# Patient Record
Sex: MALE | Race: WHITE | HISPANIC OR LATINO | Employment: UNEMPLOYED | URBAN - METROPOLITAN AREA
[De-identification: names, ages, dates, MRNs, and addresses within clinical notes are randomized per-mention and may not be internally consistent; named-entity substitution may affect disease eponyms.]

---

## 2017-12-14 ENCOUNTER — GENERIC CONVERSION - ENCOUNTER (OUTPATIENT)
Dept: FAMILY MEDICINE CLINIC | Facility: CLINIC | Age: 57
End: 2017-12-14

## 2017-12-18 ENCOUNTER — APPOINTMENT (OUTPATIENT)
Dept: LAB | Facility: CLINIC | Age: 57
End: 2017-12-18
Payer: COMMERCIAL

## 2017-12-18 ENCOUNTER — ALLSCRIPTS OFFICE VISIT (OUTPATIENT)
Dept: OTHER | Facility: OTHER | Age: 57
End: 2017-12-18

## 2017-12-18 DIAGNOSIS — E11.9 TYPE 2 DIABETES MELLITUS WITHOUT COMPLICATIONS (HCC): ICD-10-CM

## 2017-12-18 DIAGNOSIS — N17.9 ACUTE KIDNEY FAILURE (HCC): ICD-10-CM

## 2017-12-18 DIAGNOSIS — E87.0 HYPEROSMOLALITY AND HYPERNATREMIA: ICD-10-CM

## 2017-12-18 DIAGNOSIS — E87.6 HYPOKALEMIA: ICD-10-CM

## 2017-12-18 DIAGNOSIS — Z09 ENCOUNTER FOR FOLLOW-UP EXAMINATION AFTER COMPLETED TREATMENT FOR CONDITIONS OTHER THAN MALIGNANT NEOPLASM: ICD-10-CM

## 2017-12-18 LAB
ALBUMIN SERPL BCP-MCNC: 3.7 G/DL (ref 3.5–5)
ALP SERPL-CCNC: 92 U/L (ref 46–116)
ALT SERPL W P-5'-P-CCNC: 99 U/L (ref 12–78)
ANION GAP SERPL CALCULATED.3IONS-SCNC: 6 MMOL/L (ref 4–13)
AST SERPL W P-5'-P-CCNC: 68 U/L (ref 5–45)
BILIRUB SERPL-MCNC: 0.85 MG/DL (ref 0.2–1)
BUN SERPL-MCNC: 13 MG/DL (ref 5–25)
CALCIUM SERPL-MCNC: 9.7 MG/DL (ref 8.3–10.1)
CHLORIDE SERPL-SCNC: 100 MMOL/L (ref 100–108)
CO2 SERPL-SCNC: 28 MMOL/L (ref 21–32)
CREAT SERPL-MCNC: 1.11 MG/DL (ref 0.6–1.3)
GFR SERPL CREATININE-BSD FRML MDRD: 73 ML/MIN/1.73SQ M
GLUCOSE P FAST SERPL-MCNC: 268 MG/DL (ref 65–99)
POTASSIUM SERPL-SCNC: 4.3 MMOL/L (ref 3.5–5.3)
PROT SERPL-MCNC: 7.7 G/DL (ref 6.4–8.2)
SODIUM SERPL-SCNC: 134 MMOL/L (ref 136–145)

## 2017-12-18 PROCEDURE — 36415 COLL VENOUS BLD VENIPUNCTURE: CPT

## 2017-12-18 PROCEDURE — 80053 COMPREHEN METABOLIC PANEL: CPT

## 2018-01-09 ENCOUNTER — LAB CONVERSION - ENCOUNTER (OUTPATIENT)
Dept: OTHER | Facility: OTHER | Age: 58
End: 2018-01-09

## 2018-01-09 ENCOUNTER — GENERIC CONVERSION - ENCOUNTER (OUTPATIENT)
Dept: OTHER | Facility: OTHER | Age: 58
End: 2018-01-09

## 2018-01-09 LAB
BUN SERPL-MCNC: 16 MG/DL (ref 7–25)
BUN/CREA RATIO (HISTORICAL): NORMAL (CALC) (ref 6–22)
CALCIUM SERPL-MCNC: 10.1 MG/DL (ref 8.6–10.3)
CHLORIDE SERPL-SCNC: 103 MMOL/L (ref 98–110)
CHOLEST SERPL-MCNC: 195 MG/DL
CHOLEST/HDLC SERPL: 4.8 (CALC)
CO2 SERPL-SCNC: 31 MMOL/L (ref 20–31)
CREAT SERPL-MCNC: 1.05 MG/DL (ref 0.7–1.33)
EGFR AFRICAN AMERICAN (HISTORICAL): 91 ML/MIN/1.73M2
EGFR-AMERICAN CALC (HISTORICAL): 78 ML/MIN/1.73M2
GLUCOSE (HISTORICAL): 89 MG/DL (ref 65–99)
HBA1C MFR BLD HPLC: 10.9 % OF TOTAL HGB
HDLC SERPL-MCNC: 41 MG/DL
LDL CHOLESTEROL (HISTORICAL): 127 MG/DL (CALC)
NON-HDL-CHOL (CHOL-HDL) (HISTORICAL): 154 MG/DL (CALC)
POTASSIUM SERPL-SCNC: 4.5 MMOL/L (ref 3.5–5.3)
SODIUM SERPL-SCNC: 141 MMOL/L (ref 135–146)
TRIGL SERPL-MCNC: 154 MG/DL

## 2018-01-18 DIAGNOSIS — E87.0 HYPEROSMOLALITY AND HYPERNATREMIA: ICD-10-CM

## 2018-01-18 DIAGNOSIS — Z09 ENCOUNTER FOR FOLLOW-UP EXAMINATION AFTER COMPLETED TREATMENT FOR CONDITIONS OTHER THAN MALIGNANT NEOPLASM: ICD-10-CM

## 2018-01-18 DIAGNOSIS — E87.6 HYPOKALEMIA: ICD-10-CM

## 2018-01-18 DIAGNOSIS — N17.9 ACUTE KIDNEY FAILURE (HCC): ICD-10-CM

## 2018-01-18 DIAGNOSIS — E11.9 TYPE 2 DIABETES MELLITUS WITHOUT COMPLICATIONS (HCC): ICD-10-CM

## 2018-01-23 NOTE — MISCELLANEOUS
Assessment    1  Acute kidney injury (584 9) (N17 9)   2  Diabetes mellitus, new onset (250 00) (E11 9)   3  Hospital discharge follow-up (V67 59) (Z09)   4  Hypernatremia (276 0) (E87 0)   5  Hypokalemia (276 8) (E87 6)   6  Epigastric abdominal pain (789 06) (R10 13)   7  Blurred vision (368 8) (H53 8)    Plan  Acute kidney injury, Diabetes mellitus, new onset, Hospital discharge follow-up,  Hypernatremia, Hypokalemia    · (1) COMPREHENSIVE METABOLIC PANEL; Status:Complete;   Done: 98EEA5836   Perform:[facility]; TYK:[id number];RLJXBGM; For:Acute kidney injury, Diabetes mellitus, new onset, Hospital discharge follow-up, Hypernatremia, Hypokalemia; Ordered By:Jo Nava;   · (1) COMPREHENSIVE METABOLIC PANEL; Status:Resulted - Requires Verification;    Done: 47CTB1355 10:33AM   Due:25Ykw8895; Ordered; For:Acute kidney injury, Diabetes mellitus, new onset, Hospital discharge follow-up, Hypernatremia, Hypokalemia; Ordered By:Jo Nava;   · (1) HEMOGLOBIN A1C; Status:Active; Requested ASK:[id number]; Perform:[facility]; OAF:[id number];QWRLJKI; For:Acute kidney injury, Diabetes mellitus, new onset, Hospital discharge follow-up, Hypernatremia, Hypokalemia; Ordered By:Jo Nava;   · (1) LIPID PANEL, FASTING; Status:Active; Requested TXN:[id number]; Perform:[facility]; BGE:[id number];EIUFGMA; For:Acute kidney injury, Diabetes mellitus, new onset, Hospital discharge follow-up, Hypernatremia, Hypokalemia; Ordered By:Jo Nava;  Diabetes mellitus, new onset    · GlyBURIDE 5 MG Oral Tablet; TAKE 1 TABLET TWICE DAILY,  WITH meals   Rx By: Darlene Adame; Dispense: 30 Days ; #:60 Tablet; Refill: 0; For: Diabetes mellitus, new onset; RICHIE = N; Verified Transmission to [place] [id number]; Last Updated By: System, [employer]; 12/18/2017 9:33:28 AM   · (1) BASIC METABOLIC PROFILE; Status:Active; Requested PTL:[id number];     Perform:[facility]; TCV:[id number];[facility]; For:Diabetes mellitus, new onset; Ordered By:Truong Nava;  Encounter for screening colonoscopy    · COLONOSCOPY; Status:Active; Requested for:21Sjd2270;    Perform:[facility]; Due:17Tys9115; Last Updated By:Omar Das; 12/18/2017 9:14:30 AM;Ordered;  For:Encounter for screening colonoscopy; Ordered By:Truong Nava;   · *1 -  [facility]-Management  *  Status:  Active  Requested for: 97YOD2372   Ordered; For: Encounter for screening colonoscopy; Ordered By: Elysia Jimenez Performed:  Due: 24CRQ8996; Last Updated By: Zenia John; 12/18/2017 9:14:31 AM  Care Summary provided  : Yes  Epigastric abdominal pain    · RaNITidine HCl - 150 MG Oral Tablet (Zantac); one tab before bed and as needed  during the day for stomach pain   Rx By: Elysia Jimenez; Dispense: 0 Days ; #:60 Tablet; Refill: 1; For: Epigastric abdominal pain; RICHIE = N; Verified Transmission to [facility]; Last Updated By: System, [employer]; 12/18/2017 9:33:28 AM    Discussion/Summary  Discussion Summary:   ROSANNE - new DM at [facility] glyburide to twice a day  keep levemir to 15 units  May be able to start metformin at next visit consider weaning levemir off  Need update labs this week - see Dr Asad Cole in 2 weeks  Needs eye exam - had blurry vision in hosp     + epigastric pain - exam benign - consistent w/ GERD - take zantac before bed and as needed during the day  Counseling Documentation With Imm: The patient was counseled regarding diagnostic results, instructions for management, risk factor reductions, prognosis, patient and family education, impressions, risks and benefits of treatment options, importance of compliance with treatment  Medication SE Review and Pt Understands Tx: Possible side effects of new medications were reviewed with the patient/guardian today  The treatment plan was reviewed with the patient/guardian   The patient/guardian understands and agrees with the treatment plan   Self Referrals:   Self Referrals: No      Chief Complaint  Chief Complaint Free Text Note Form: pt here for a ROSANNE visit  pt has never been on any medications  pt states that the insulin prescribed is costly and wants to change to something less expensive  complaining of some stomach discomfort  pt needs note for work       History of Present Illness  TCM Communication [place]: The patient is being contacted for Contact made with Maci Washington from [address] [employer] [employer] [facility] prior to patient being discharged on 12/14/2017  He was hospitalized at [facility]#[phone]  The date of admission: 12/13/2017, date of discharge: 12/14/2017  Diagnosis: fatigue, frequent urination, sore throat  He was discharged to home  Medications were not reviewed today  He scheduled a follow up appointment  Follow-up appointments with other specialists: pcp follow up 12/18/2017  Communication performed and completed by Jenniffer German   HPI: 40-year-old  male male here to establish care from a hospitalization and [facility] - patient without any past medical history except for what he presented to the hospital    pt was transferred to [place] from his work in [address] [facility]  S W - bc of blurry visin/ an polydipsia and polyuria   > Patient diagnosed with acute onset of diabetes mellitus with a blood sugar initially above a 1000, hyponatremia, acute kidney injury and electrolyte imbalance  Patient was fluid resuscitated and started on IV insulin and DKA resolved  Patient was transferred to the Levemir insulin 15 units nightly plus glyburide p o  daily  The patient was given a glucometer to use at home  Patient was also started on lisinopril 5  Reports blood sugar this morning was 257  Most recent labs from the hospital were reviewed CBC was normal   Potassium 3 2  BUN 11 %period% creatinine 0 88 with a GFR greater than 60  Albumin 4 6     LDL 89 and HDL 34   12 point ROS neg except stomach pain - mild - no b/v/d - no chg w/ food/ no radiation      Review of Systems  Complete-Male:   Constitutional: No fever or chills, feels well, no tiredness, no recent weight gain or weight loss  Eyes: No complaints of eye pain, no red eyes, no discharge from eyes, no itchy eyes  ENT: no complaints of earache, no hearing loss, no nosebleeds, no nasal discharge, no sore throat, no hoarseness  Cardiovascular: No complaints of slow heart rate, no fast heart rate, no chest pain, no palpitations, no leg claudication, no lower extremity  Respiratory: No complaints of shortness of breath, no wheezing, no cough, no SOB on exertion, no orthopnea or PND  Gastrointestinal: abdominal pain, but no nausea, no vomiting, no constipation, no diarrhea and no blood in stools  Genitourinary: No complaints of dysuria, no incontinence, no hesitancy, no nocturia, no genital lesion, no testicular pain  Musculoskeletal: No complaints of arthralgia, no myalgias, no joint swelling or stiffness, no limb pain or swelling  Integumentary: No complaints of skin rash or skin lesions, no itching, no skin wound, no dry skin  Neurological: No compliants of headache, no confusion, no convulsions, no numbness or tingling, no dizziness or fainting, no limb weakness, no difficulty walking  Psychiatric: Is not suicidal, no sleep disturbances, no anxiety or depression, no change in personality, no emotional problems  Endocrine: No complaints of proptosis, no hot flashes, no muscle weakness, no erectile dysfunction, no deepening of the voice, no feelings of weakness and as noted in HPI  Hematologic/Lymphatic: No complaints of swollen glands, no swollen glands in the neck, does not bleed easily, no easy bruising  Surgical History    1  History of Prior Surgical Procedure Not Done  Surgical History Reviewed: The surgical history was reviewed and updated today  Family History  Father    1   Family history of malignant neoplasm (V16 9) (Z80 9)    Social History    · Caffeine use (V49 89) (F15 90)   · Does not exercise (V69 0) (Z72 3)   · Full-time employment   · Never a smoker   · No illicit drug use   · Non-smoker (V49 89) (Z78 9)   · Nonuser of seat belts   · [employer]   · Single   · Social alcohol use (Z78 9)  Social History Reviewed: The social history was reviewed and updated today  Current Meds   1  GlyBURIDE 5 MG Oral Tablet; TAKE 1 TABLET DAILY; Therapy: (Recorded:76Hkt6587) to Recorded   2  Levemir 100 UNIT/ML Subcutaneous Solution; INJECT 15 UNIT Bedtime; Therapy: (Recorded:28Cfa8188) to Recorded   3  Lisinopril 5 MG Oral Tablet; TAKE 1 TABLET DAILY; Therapy: (Recorded:64Foj6920) to Recorded    Allergies    1  No Known Drug Allergies    Vitals  Signs   Recorded: 22VOM5029 08:52AM   Temperature: 99 2 F  Heart Rate: 71  Respiration: 16  Systolic: 867  Diastolic: 96  Height: 5 ft 6 in  Weight: 166 lb   BMI Calculated: 26 79  BSA Calculated: 1 85  O2 Saturation: 95  Pain Scale: 0    Physical Exam    Constitutional   General appearance: No acute distress, well appearing and well nourished  Eyes   Conjunctiva and lids: No swelling, erythema, or discharge  Pupils and irises: Equal, round and reactive to light  Ears, Nose, Mouth, and Throat   External inspection of ears and nose: Normal     Otoscopic examination: Tympanic membrance translucent with normal light reflex  Canals patent without erythema  Nasal mucosa, septum, and turbinates: Normal without edema or erythema  Oropharynx: Normal with no erythema, edema, exudate or lesions  Pulmonary   Respiratory effort: No increased work of breathing or signs of respiratory distress  Auscultation of lungs: Clear to auscultation, equal breath sounds bilaterally, no wheezes, no rales, no rhonci  Cardiovascular   Palpation of heart: Normal PMI, no thrills  Auscultation of heart: Normal rate and rhythm, normal S1 and S2, without murmurs      Examination of extremities for edema and/or varicosities: Normal     Carotid pulses: Normal     Abdomen   Abdomen: Non-tender, no masses  Bowel sounds were normal  The abdomen was soft and nontender  no masses palpated  Liver and spleen: No hepatomegaly or splenomegaly  Lymphatic   Palpation of lymph nodes in neck: No lymphadenopathy  Musculoskeletal   Gait and station: Normal     Skin   Skin and subcutaneous tissue: Normal without rashes or lesions  Neurologic   Cranial nerves: Cranial nerves 2-12 intact  Psychiatric   Orientation to person, place and time: Normal     Mood and affect: Normal          Results/Data  (1) COMPREHENSIVE METABOLIC PANEL [id number] 04:07OY Vito Lozoya    Order Number: [id number]     Test Name Result Flag Reference   SODIUM 134 mmol/L L 136-145   POTASSIUM 4 3 mmol/L  3 5-5 3   CHLORIDE 100 mmol/L  100-108   CARBON DIOXIDE 28 mmol/L  21-32   ANION GAP (CALC) 6 mmol/L  4-13   BLOOD UREA NITROGEN 13 mg/dL  5-25   CREATININE 1 11 mg/dL  0 60-1 30   Standardized to IDMS reference method   CALCIUM 9 7 mg/dL  8 3-10 1   BILI, TOTAL 0 85 mg/dL  0 20-1 00   ALK PHOSPHATAS 92 U/L     ALT (SGPT) 99 U/L H 12-78   Specimen collection should occur prior to Sulfasalazine and/or Sulfapyridine administration due to the potential for falsely depressed results  AST(SGOT) 68 U/L H 5-45   Specimen collection should occur prior to Sulfasalazine administration due to the potential for falsely depressed results  ALBUMIN 3 7 g/dL  3 5-5 0   TOTAL PROTEIN 7 7 g/dL  6 4-8 2   eGFR 73 ml/min/1 73sq m     National Kidney Disease Education Program recommendations are as follows:  GFR calculation is accurate only with a steady state creatinine  Chronic Kidney disease less than 60 ml/min/1 73 sq  meters  Kidney failure less than 15 ml/min/1 73 sq  meters     GLUCOSE FASTING 268 mg/dL H 65-99   Specimen collection should occur prior to Sulfasalazine administration due to the potential for falsely depressed results  Specimen collection should occur prior to Sulfapyridine administration due to the potential for falsely elevated results  Attending Note  Collaborating Physician Note: Collaborating Physician: I agree with the Advanced Practitioner note  Future Appointments    Date/Time Provider Specialty Site   01/26/2018 09:30 AM YULISSA Yaevgerry Torres 476   01/31/2018 10:40 AM Brett Victoria MD Gastroenterology Adult [facility] [address] [place] [address]   Electronically signed by : Munira Villalba; Dec 18 2017  9:50AM EST                       (Author)    Electronically signed by : YULISSA Ellington ; Dec 18 2017  5:11PM EST                       (Author)

## 2018-01-23 NOTE — RESULT NOTES
Verified Results  (1) LIPID PANEL, FASTING [id number] 08:07AM Hedda Garden     Test Name Result Flag Reference   CHOLESTEROL, TOTAL 195 mg/dL  <200   HDL CHOLESTEROL 41 mg/dL  >13   TRIGLICERIDES 518 mg/dL H <150   LDL-CHOLESTEROL 127 mg/dL (calc) H    Reference range: <100     Desirable range <100 mg/dL for patients with CHD or  diabetes and <70 mg/dL for diabetic patients with  known heart disease  LDL-C is now calculated using the Elías-Ken   calculation, which is a validated novel method providing   better accuracy than the Friedewald equation in the   estimation of LDL-C  Jaqueline Tompkins  [place] [id number];279(89): [phone]   (http://[employer]/faq/ZGR473)   CHOL/HDLC RATIO 4 8 (calc)  <5 0   NON HDL CHOLESTEROL 154 mg/dL (calc) H <130   For patients with diabetes plus 1 major ASCVD risk   factor, treating to a non-HDL-C goal of <100 mg/dL   (LDL-C of <70 mg/dL) is considered a therapeutic   option  (1) BASIC METABOLIC PROFILE [id number] 08:07AM Hedda Garden     Test Name Result Flag Reference   GLUCOSE 89 mg/dL  65-99   Fasting reference interval   UREA NITROGEN (BUN) 16 mg/dL  7-25   CREATININE 1 05 mg/dL  0 70-1 33   For patients >52years of age, the reference limit  for Creatinine is approximately 13% higher for people  identified as -American  eGFR NON-AFR   AMERICAN 78 mL/min/1 73m2  > OR = 60   eGFR AFRICAN AMERICAN 91 mL/min/1 73m2  > OR = 60   BUN/CREATININE RATIO   5-44   NOT APPLICABLE (calc)   SODIUM 141 mmol/L  135-146   POTASSIUM 4 5 mmol/L  3 5-5 3   CHLORIDE 103 mmol/L     CARBON DIOXIDE 31 mmol/L  20-31   CALCIUM 10 1 mg/dL  8 6-10 3     (Q) HEMOGLOBIN A1c [id number] 08:07AM Jo Nava   REPORT COMMENT:  FASTING:YES     Test Name Result Flag Reference   HEMOGLOBIN A1c 10 9 % of total Hgb H <5 7   For someone without known diabetes, a hemoglobin A1c  value of 6 5% or greater indicates that they may have   diabetes and this should be confirmed with a follow-up   test      For someone with known diabetes, a value <7% indicates   that their diabetes is well controlled and a value   greater than or equal to 7% indicates suboptimal   control  A1c targets should be individualized based on   duration of diabetes, age, comorbid conditions, and   other considerations  Currently, no consensus exists regarding use of  hemoglobin A1c for diagnosis of diabetes for children

## 2018-01-23 NOTE — MISCELLANEOUS
Message  Return to work or school:   Pooja Moraes is under my professional care   He was seen in my office on 12/18/2017   He is able to return to work on  12/25/2017            Signatures   Electronically signed by : Katya Carrillo, ; Dec 18 2017  9:56AM EST                       (/Recorder)

## 2018-01-24 VITALS
DIASTOLIC BLOOD PRESSURE: 96 MMHG | RESPIRATION RATE: 16 BRPM | SYSTOLIC BLOOD PRESSURE: 142 MMHG | OXYGEN SATURATION: 95 % | HEIGHT: 66 IN | WEIGHT: 166 LBS | HEART RATE: 71 BPM | BODY MASS INDEX: 26.68 KG/M2 | TEMPERATURE: 99.2 F

## 2018-01-26 ENCOUNTER — OFFICE VISIT (OUTPATIENT)
Dept: FAMILY MEDICINE CLINIC | Facility: CLINIC | Age: 58
End: 2018-01-26
Payer: COMMERCIAL

## 2018-01-26 VITALS
RESPIRATION RATE: 16 BRPM | HEART RATE: 61 BPM | BODY MASS INDEX: 27.8 KG/M2 | WEIGHT: 173 LBS | HEIGHT: 66 IN | OXYGEN SATURATION: 98 % | DIASTOLIC BLOOD PRESSURE: 78 MMHG | SYSTOLIC BLOOD PRESSURE: 130 MMHG | TEMPERATURE: 98.6 F

## 2018-01-26 DIAGNOSIS — E11.9 DIABETES MELLITUS, NEW ONSET (HCC): Primary | ICD-10-CM

## 2018-01-26 DIAGNOSIS — R10.13 EPIGASTRIC ABDOMINAL PAIN: ICD-10-CM

## 2018-01-26 PROBLEM — N17.9 ACUTE KIDNEY INJURY (HCC): Status: ACTIVE | Noted: 2017-12-18

## 2018-01-26 PROBLEM — E87.6 HYPOKALEMIA: Status: ACTIVE | Noted: 2017-12-18

## 2018-01-26 PROBLEM — E87.0 HYPERNATREMIA: Status: ACTIVE | Noted: 2017-12-18

## 2018-01-26 PROBLEM — H53.8 BLURRED VISION: Status: ACTIVE | Noted: 2017-12-18

## 2018-01-26 PROCEDURE — 99214 OFFICE O/P EST MOD 30 MIN: CPT | Performed by: FAMILY MEDICINE

## 2018-01-26 RX ORDER — GLYBURIDE 5 MG/1
TABLET ORAL
COMMUNITY
End: 2018-02-05 | Stop reason: SDUPTHER

## 2018-01-26 RX ORDER — LISINOPRIL 5 MG/1
1 TABLET ORAL DAILY
COMMUNITY
End: 2018-04-19 | Stop reason: SDUPTHER

## 2018-01-26 RX ORDER — RANITIDINE 150 MG/1
TABLET ORAL
COMMUNITY
Start: 2017-12-18 | End: 2018-04-19 | Stop reason: SDUPTHER

## 2018-01-26 NOTE — PROGRESS NOTES
Assessment/Plan:    No problem-specific Assessment & Plan notes found for this encounter  Diagnoses and all orders for this visit:    Diabetes mellitus, new onset ([address] )    Epigastric abdominal pain    Other orders  -     glyBURIDE (DIABETA) 5 mg tablet; Take by mouth  -     insulin detemir (LEVEMIR) 100 units/mL subcutaneous injection; Inject 15 Units under the skin  -     lisinopril (ZESTRIL) 5 mg tablet; Take 1 tablet by mouth daily  -     ranitidine (ZANTAC) 150 mg tablet; Take by mouth    Fu in 3 month + labs        Subjective:   Pt here for follow up visit  Pt feels good today  Pt has no issues with new medications given  Pt states that his glucose reading have been low 77-80 fasting  Pt wants to know how many times he should check glucose  Pt needs eye exam, will print out order again     Patient ID: Idania Oreilly is a 62 y o  male  HPI   He states doing better, his fasting sugar are under control at 77-80  He has tried modified DM diet  Taking meds and tolerating  The following portions of the patient's history were reviewed and updated as appropriate: allergies, current medications, past family history, past medical history, past social history, past surgical history and problem list     Review of Systems   Constitutional: Negative  HENT: Negative  Respiratory: Negative  Cardiovascular: Negative  Genitourinary: Negative  Neurological: Negative  Psychiatric/Behavioral: Negative  Objective:  /78   Pulse 61   Temp 98 6 °F (37 °C)   Resp 16   Ht 5' 6" (1 676 m)   Wt 78 5 kg (173 lb)   SpO2 98%   BMI 27 92 kg/m²      Physical Exam   Constitutional: He is oriented to person, place, and time  He appears well-developed and well-nourished  HENT:   Head: Normocephalic and atraumatic  Eyes: Conjunctivae are normal    Neck: Neck supple  Cardiovascular: Normal rate, regular rhythm and normal heart sounds      Pulmonary/Chest: Effort normal and breath sounds normal    Musculoskeletal: Normal range of motion  Neurological: He is alert and oriented to person, place, and time  Psychiatric: He has a normal mood and affect   His behavior is normal

## 2018-01-29 DIAGNOSIS — Z12.11 ENCOUNTER FOR SCREENING COLONOSCOPY: Primary | ICD-10-CM

## 2018-01-31 ENCOUNTER — OFFICE VISIT (OUTPATIENT)
Dept: GASTROENTEROLOGY | Facility: MEDICAL CENTER | Age: 58
End: 2018-01-31
Payer: COMMERCIAL

## 2018-01-31 VITALS
TEMPERATURE: 96.5 F | HEART RATE: 60 BPM | BODY MASS INDEX: 28.28 KG/M2 | HEIGHT: 66 IN | SYSTOLIC BLOOD PRESSURE: 120 MMHG | DIASTOLIC BLOOD PRESSURE: 80 MMHG | WEIGHT: 176 LBS

## 2018-01-31 DIAGNOSIS — R10.13 EPIGASTRIC ABDOMINAL PAIN: Primary | ICD-10-CM

## 2018-01-31 DIAGNOSIS — Z12.11 ENCOUNTER FOR SCREENING COLONOSCOPY: ICD-10-CM

## 2018-01-31 PROCEDURE — 99244 OFF/OP CNSLTJ NEW/EST MOD 40: CPT | Performed by: INTERNAL MEDICINE

## 2018-01-31 RX ORDER — PEG-3350, SODIUM SULFATE, SODIUM CHLORIDE, POTASSIUM CHLORIDE, SODIUM ASCORBATE AND ASCORBIC ACID 7.5-2.691G
2000 KIT ORAL ONCE
Qty: 2000 ML | Refills: 0 | Status: SHIPPED | OUTPATIENT
Start: 2018-01-31 | End: 2018-11-05

## 2018-01-31 NOTE — LETTER
January 31, 2018     Anisa Samuel MD  [facility]  [address]  [address] [place] [address]    Patient: Enrique Whitt   YOB: 1960   Date of Visit: 1/31/2018       Dear Dr Carreon Blunt: Thank you for referring Enrique Whitt to me for evaluation  Below are my notes for this consultation  If you have questions, please do not hesitate to call me  I look forward to following your patient along with you  Sincerely,        Kayla Blakely MD        CC: No Recipients  Kayla Blakely MD  1/31/2018 11:16 AM  Sign at close encounter  [facility] - Outpatient Consultation  Enrique Whitt 62 y o  male MRN: [id number]  Encounter: [id number]          ASSESSMENT AND PLAN:      1  Epigastric abdominal pain  His epigastric pain was likely due to peptic ulcer disease, erosive gastritis, or Helicobacter pylori infection  It is unclear what medication he has been taking but it may be omeprazole  I have asked him to continue the medication since it is helping and bring the name with him to his procedure  I will schedule for an upper endoscopy to evaluate the etiology of his pain and take biopsies for Helicobacter pylori infection     - Case request operating room: EGD    2  Encounter for screening colonoscopy  He is due for screening colonoscopy given his age over 48  I spoke about the benefits and risks of the procedure as well as instructions for the bowel preparation and answered all of his questions  Per his preference, I will schedule this procedure for him at the time of his upper endoscopy  - Case request operating room: COLONOSCOPY  - PEG 3350-KCl-NaCl-NaSulf-Na Asc-C (MOVIPREP) 100 g; Take 2,000 mL by mouth once for 1 dose  Dispense: 2000 mL; Refill: 0    ______________________________________________________________________    HPI:  He presents for evaluation of epigastric pain that began about six weeks ago    Around that time he was diagnosed with diabetes mellitus and started on treatment for this  He was also started on another medication for his epigastric pain but does not remember what it was  He is still taking that medication and feels his pain has resolved  He has not had any nausea, vomiting, reflux, dysphagia, change in bowel habits, bleeding, or weight loss  He denies any prior history of an upper endoscopy or colonoscopy  REVIEW OF SYSTEMS:    CONSTITUTIONAL: Denies any fever, chills, rigors, and weight loss  HEENT: No earache or tinnitus  Denies hearing loss or visual disturbances  CARDIOVASCULAR: No chest pain or palpitations  RESPIRATORY: Denies any cough, hemoptysis, shortness of breath or dyspnea on exertion  GASTROINTESTINAL: As noted in the History of Present Illness  GENITOURINARY: No problems with urination  Denies any hematuria or dysuria  NEUROLOGIC: No dizziness or vertigo, denies headaches  MUSCULOSKELETAL: Denies any muscle or joint pain  SKIN: Denies skin rashes or itching  ENDOCRINE: Denies excessive thirst  Denies intolerance to heat or cold  PSYCHOSOCIAL: Denies depression or anxiety  Denies any recent memory loss  Historical Information   History reviewed  No pertinent past medical history  History reviewed  No pertinent surgical history    Social History   History   Alcohol Use    Yes     Comment: social alcohol use     History   Drug Use No     History   Smoking Status    Never Smoker   Smokeless Tobacco    Never Used     Family History   Problem Relation Age of Onset    Cancer Father        Meds/Allergies       Current Outpatient Prescriptions:     glyBURIDE (DIABETA) 5 mg tablet    insulin detemir (LEVEMIR) 100 units/mL subcutaneous injection    lisinopril (ZESTRIL) 5 mg tablet    ranitidine (ZANTAC) 150 mg tablet    PEG 3350-KCl-NaCl-NaSulf-Na Asc-C (MOVIPREP) 100 g    No Known Allergies        Objective     Blood pressure 120/80, pulse 60, temperature (!) 96 5 °F (35 8 °C), temperature source Tympanic, height 5' 6" (1 676 m), weight 79 8 kg (176 lb)  PHYSICAL EXAM:      General Appearance:   Alert, cooperative, no distress   HEENT:   Normocephalic, atraumatic, anicteric      Neck:  Supple, symmetrical, trachea midline   Lungs:   Clear to auscultation bilaterally; no rales, rhonchi or wheezing; respirations unlabored    Heart[de-identified]   Regular rate and rhythm; no murmur, rub, or gallop  Abdomen:   Soft, non-tender, non-distended; normal bowel sounds; no masses, no organomegaly    Genitalia:   Deferred    Rectal:   Deferred    Extremities:  No cyanosis, clubbing or edema    Pulses:  2+ and symmetric all extremities    Skin:  No jaundice, rashes, or lesions    Lymph nodes:  No palpable cervical lymphadenopathy        Lab Results:   No visits with results within 1 Day(s) from this visit     Latest known visit with results is:   Lab Conversion - Encounter on 01/09/2018   Component Date Value    Hemoglobin A1C 01/08/2018 10 9*

## 2018-01-31 NOTE — PATIENT INSTRUCTIONS
Colon/EGD sched on 3/19/18 with Dr Moisés Mcmullen at [facility]  Moviprep/ instructions giving to pt   Pt is diabetic

## 2018-01-31 NOTE — PROGRESS NOTES
[facility] - Outpatient Consultation  Kaleb MadridLadora 62 y o  male MRN: [id number]  Encounter: [id number]          ASSESSMENT AND PLAN:      1  Epigastric abdominal pain  His epigastric pain was likely due to peptic ulcer disease, erosive gastritis, or Helicobacter pylori infection  It is unclear what medication he has been taking but it may be omeprazole  I have asked him to continue the medication since it is helping and bring the name with him to his procedure  I will schedule for an upper endoscopy to evaluate the etiology of his pain and take biopsies for Helicobacter pylori infection     - Case request operating room: EGD    2  Encounter for screening colonoscopy  He is due for screening colonoscopy given his age over 48  I spoke about the benefits and risks of the procedure as well as instructions for the bowel preparation and answered all of his questions  Per his preference, I will schedule this procedure for him at the time of his upper endoscopy  - Case request operating room: COLONOSCOPY  - PEG 3350-KCl-NaCl-NaSulf-Na Asc-C (MOVIPREP) 100 g; Take 2,000 mL by mouth once for 1 dose  Dispense: 2000 mL; Refill: 0    ______________________________________________________________________    HPI:  He presents for evaluation of epigastric pain that began about six weeks ago  Around that time he was diagnosed with diabetes mellitus and started on treatment for this  He was also started on another medication for his epigastric pain but does not remember what it was  He is still taking that medication and feels his pain has resolved  He has not had any nausea, vomiting, reflux, dysphagia, change in bowel habits, bleeding, or weight loss  He denies any prior history of an upper endoscopy or colonoscopy  REVIEW OF SYSTEMS:    CONSTITUTIONAL: Denies any fever, chills, rigors, and weight loss  HEENT: No earache or tinnitus   Denies hearing loss or visual disturbances  CARDIOVASCULAR: No chest pain or palpitations  RESPIRATORY: Denies any cough, hemoptysis, shortness of breath or dyspnea on exertion  GASTROINTESTINAL: As noted in the History of Present Illness  GENITOURINARY: No problems with urination  Denies any hematuria or dysuria  NEUROLOGIC: No dizziness or vertigo, denies headaches  MUSCULOSKELETAL: Denies any muscle or joint pain  SKIN: Denies skin rashes or itching  ENDOCRINE: Denies excessive thirst  Denies intolerance to heat or cold  PSYCHOSOCIAL: Denies depression or anxiety  Denies any recent memory loss  Historical Information   History reviewed  No pertinent past medical history  History reviewed  No pertinent surgical history  Social History   History   Alcohol Use    Yes     Comment: social alcohol use     History   Drug Use No     History   Smoking Status    Never Smoker   Smokeless Tobacco    Never Used     Family History   Problem Relation Age of Onset    Cancer Father        Meds/Allergies       Current Outpatient Prescriptions:     glyBURIDE (DIABETA) 5 mg tablet    insulin detemir (LEVEMIR) 100 units/mL subcutaneous injection    lisinopril (ZESTRIL) 5 mg tablet    ranitidine (ZANTAC) 150 mg tablet    PEG 3350-KCl-NaCl-NaSulf-Na Asc-C (MOVIPREP) 100 g    No Known Allergies        Objective     Blood pressure 120/80, pulse 60, temperature (!) 96 5 °F (35 8 °C), temperature source Tympanic, height 5' 6" (1 676 m), weight 79 8 kg (176 lb)  PHYSICAL EXAM:      General Appearance:   Alert, cooperative, no distress   HEENT:   Normocephalic, atraumatic, anicteric      Neck:  Supple, symmetrical, trachea midline   Lungs:   Clear to auscultation bilaterally; no rales, rhonchi or wheezing; respirations unlabored    Heart[de-identified]   Regular rate and rhythm; no murmur, rub, or gallop     Abdomen:   Soft, non-tender, non-distended; normal bowel sounds; no masses, no organomegaly    Genitalia:   Deferred    Rectal:   Deferred  Extremities:  No cyanosis, clubbing or edema    Pulses:  2+ and symmetric all extremities    Skin:  No jaundice, rashes, or lesions    Lymph nodes:  No palpable cervical lymphadenopathy        Lab Results:   No visits with results within 1 Day(s) from this visit     Latest known visit with results is:   Lab Conversion - Encounter on 01/09/2018   Component Date Value    Hemoglobin A1C 01/08/2018 10 9*

## 2018-02-05 DIAGNOSIS — E13.8 DIABETES MELLITUS OF OTHER TYPE WITH COMPLICATION, UNSPECIFIED LONG TERM INSULIN USE STATUS: Primary | ICD-10-CM

## 2018-02-05 RX ORDER — GLYBURIDE 5 MG/1
TABLET ORAL
Qty: 60 TABLET | Refills: 5 | Status: SHIPPED | OUTPATIENT
Start: 2018-02-05 | End: 2018-04-19 | Stop reason: SDUPTHER

## 2018-03-18 ENCOUNTER — ANESTHESIA EVENT (OUTPATIENT)
Dept: GASTROENTEROLOGY | Facility: MEDICAL CENTER | Age: 58
End: 2018-03-18
Payer: COMMERCIAL

## 2018-03-19 ENCOUNTER — ANESTHESIA (OUTPATIENT)
Dept: GASTROENTEROLOGY | Facility: MEDICAL CENTER | Age: 58
End: 2018-03-19
Payer: COMMERCIAL

## 2018-03-19 ENCOUNTER — HOSPITAL ENCOUNTER (OUTPATIENT)
Facility: MEDICAL CENTER | Age: 58
Setting detail: OUTPATIENT SURGERY
Discharge: HOME | End: 2018-03-19
Attending: INTERNAL MEDICINE | Admitting: INTERNAL MEDICINE
Payer: COMMERCIAL

## 2018-03-19 VITALS
HEART RATE: 77 BPM | TEMPERATURE: 98.4 F | RESPIRATION RATE: 13 BRPM | BODY MASS INDEX: 28.28 KG/M2 | WEIGHT: 176 LBS | DIASTOLIC BLOOD PRESSURE: 82 MMHG | OXYGEN SATURATION: 98 % | SYSTOLIC BLOOD PRESSURE: 129 MMHG | HEIGHT: 66 IN

## 2018-03-19 DIAGNOSIS — R10.13 EPIGASTRIC ABDOMINAL PAIN: ICD-10-CM

## 2018-03-19 DIAGNOSIS — Z12.11 ENCOUNTER FOR SCREENING COLONOSCOPY: ICD-10-CM

## 2018-03-19 LAB — GLUCOSE SERPL-MCNC: 87 MG/DL (ref 65–140)

## 2018-03-19 PROCEDURE — 88305 TISSUE EXAM BY PATHOLOGIST: CPT | Performed by: PATHOLOGY

## 2018-03-19 PROCEDURE — 43239 EGD BIOPSY SINGLE/MULTIPLE: CPT | Performed by: INTERNAL MEDICINE

## 2018-03-19 PROCEDURE — 88342 IMHCHEM/IMCYTCHM 1ST ANTB: CPT | Performed by: PATHOLOGY

## 2018-03-19 PROCEDURE — 45380 COLONOSCOPY AND BIOPSY: CPT | Performed by: INTERNAL MEDICINE

## 2018-03-19 PROCEDURE — 82948 REAGENT STRIP/BLOOD GLUCOSE: CPT

## 2018-03-19 RX ORDER — PROPOFOL 10 MG/ML
INJECTION, EMULSION INTRAVENOUS AS NEEDED
Status: DISCONTINUED | OUTPATIENT
Start: 2018-03-19 | End: 2018-03-19 | Stop reason: SURG

## 2018-03-19 RX ORDER — SODIUM CHLORIDE 9 MG/ML
125 INJECTION, SOLUTION INTRAVENOUS CONTINUOUS
Status: DISCONTINUED | OUTPATIENT
Start: 2018-03-19 | End: 2018-03-19 | Stop reason: HOSPADM

## 2018-03-19 RX ADMIN — PROPOFOL 50 MG: 10 INJECTION, EMULSION INTRAVENOUS at 09:48

## 2018-03-19 RX ADMIN — SODIUM CHLORIDE: 0.9 INJECTION, SOLUTION INTRAVENOUS at 09:20

## 2018-03-19 RX ADMIN — PROPOFOL 50 MG: 10 INJECTION, EMULSION INTRAVENOUS at 09:42

## 2018-03-19 RX ADMIN — SODIUM CHLORIDE 125 ML/HR: 0.9 INJECTION, SOLUTION INTRAVENOUS at 08:47

## 2018-03-19 RX ADMIN — PROPOFOL 50 MG: 10 INJECTION, EMULSION INTRAVENOUS at 09:55

## 2018-03-19 RX ADMIN — PROPOFOL 150 MG: 10 INJECTION, EMULSION INTRAVENOUS at 09:27

## 2018-03-19 RX ADMIN — LIDOCAINE HYDROCHLORIDE 50 MG: 20 INJECTION, SOLUTION INTRAVENOUS at 09:27

## 2018-03-19 RX ADMIN — PROPOFOL 50 MG: 10 INJECTION, EMULSION INTRAVENOUS at 09:36

## 2018-03-19 NOTE — OP NOTE
OPERATIVE REPORT  PATIENT NAME: Titus Soulier    :  1960  MRN: [id number]  Pt Location: [facility] GI ROOM 01    SURGERY DATE: 3/19/2018    Surgeon(s) and Role:     * Antonino Allen MD - Primary    Colonoscopy Procedure Note    Procedure: Colonoscopy    Sedation: Monitored anesthesia care, check anesthesia records      ASA Class: 2    INDICATIONS: Screening for Colon Cancer    POST-OP DIAGNOSIS: See the impression below    Procedure Details     Prior colonoscopy: No prior colonoscopy  Informed consent was obtained for the procedure, including sedation  Risks of perforation, hemorrhage, adverse drug reaction and aspiration were discussed  The patient was placed in the left lateral decubitus position  Based on the pre-procedure assessment, including review of the patient's medical history, medications, allergies, and review of systems, he had been deemed to be an appropriate candidate for conscious sedation; he was therefore sedated with the medications listed below  The patient was monitored continuously with telemetry, pulse oximetry, blood pressure monitoring, and direct observations  A rectal examination was performed  The colonoscope was inserted into the rectum and advanced under direct vision to the cecum, which was identified by the ileocecal valve and appendiceal orifice  The quality of the colonic preparation was good  A careful inspection was made as the colonoscope was withdrawn, including a retroflexed view of the rectum; findings and interventions are described below  Findings: There was a 3 mm sessile polyp in the sigmoid colon that was removed with excisional biopsy  Small hemorrhoids were seen on the retroflexed view of the exam was otherwise unremarkable  Complications:  None; patient tolerated the procedure well  Impression:    Small sigmoid polyp removed    Recommendations:  Repeat colonoscopy in five years if the polyp is an adenoma      SIGNATURE: Bhavin Gonzalez Munir Murillo MD  DATE: March 19, 2018  TIME: 10:00 AM

## 2018-03-19 NOTE — ANESTHESIA PREPROCEDURE EVALUATION
Review of Systems/Medical History  Patient summary reviewed  Chart reviewed  No history of anesthetic complications     Cardiovascular  Negative cardio ROS Hypertension controlled,    Pulmonary  Negative pulmonary ROS        GI/Hepatic    GERD well controlled, Bowel prep            Endo/Other  Diabetes well controlled type 2 Insulin,      GYN       Hematology  Negative hematology ROS      Musculoskeletal  Negative musculoskeletal ROS        Neurology  Negative neurology ROS      Psychology   Negative psychology ROS              Physical Exam    Airway    Mallampati score: II  TM Distance: >3 FB  Neck ROM: full     Dental       Cardiovascular  Comment: Negative ROS, Rhythm: regular, Rate: normal, Cardiovascular exam normal    Pulmonary  Pulmonary exam normal Breath sounds clear to auscultation,     Other Findings        Anesthesia Plan  ASA Score- 2     Anesthesia Type- IV sedation with anesthesia with ASA Monitors  Additional Monitors:   Airway Plan:         Plan Factors-Patient not instructed to abstain from smoking on day of procedure  Patient did not smoke on day of surgery  Induction-     Postoperative Plan-     Informed Consent- Anesthetic plan and risks discussed with patient

## 2018-03-19 NOTE — OP NOTE
OPERATIVE REPORT  PATIENT NAME: Enrique Whitt    :  1960  MRN: [id number]  Pt Location: [facility] GI ROOM 01    SURGERY DATE: 3/19/2018    Surgeon(s) and Role:     * Kayla Blakely MD - Primary    ESOPHAGOGASTRODUODENOSCOPY    PROCEDURE: EGD    SEDATION: Monitored anesthesia care, check anesthesia records    ASA Class: 2    INDICATIONS:  Epigastric pain  CONSENT:  Informed consent was obtained for the procedure, including sedation after explaining the risks and benefits of the procedure  Risks including but not limited to bleeding, perforation, infection, and missed lesion  PREPARATION:   Telemetry, pulse oximetry, blood pressure were monitored throughout the procedure  Patient was identified by myself both verbally and by visual inspection of ID band  DESCRIPTION:   Patient was placed in the left lateral decubitus position and was sedated with the above medication  The gastroscope was introduced in to the oropharynx and the esophagus was intubated under direct visualization  Scope was passed down the esophagus up to 2nd part of the duodenum  A careful inspection was made as the gastroscope was withdrawn, including a retroflexed view of the stomach; findings and interventions are described below  FINDINGS:    #1  Esophagus- there is a tiny polyp in the distal esophagus that was removed with excisional biopsy  #2  Stomach- mild gastritis was noted  Random biopsies were taken from the antrum and body to rule out Helicobacter pylori infection  Retroflexed view of the stomach was unremarkable  #3  Duodenum- mild duodenitis was noted  IMPRESSIONS:      Tiny esophageal polyp  Mild gastritis    RECOMMENDATIONS:     Await pathology results  Continue current medications  Colonoscopy to follow    COMPLICATIONS:  None; patient tolerated the procedure well            DISPOSITION: PACU           CONDITION: Stable      SIGNATURE: Kayla Blakely MD  DATE: 2018  TIME: 9:41 AM

## 2018-03-19 NOTE — H&P
History and Physical -  [facility]  Caridad Sorensen 62 y o  male MRN: [id number]                  HPI: Caridad Sorensen is a 62y o  year old male who presents for epigastric pain and colon cancer screening  REVIEW OF SYSTEMS: Per the HPI, and otherwise unremarkable  Historical Information   Past Medical History:   Diagnosis Date    Diabetes mellitus ([address] )     Epigastric abdominal pain     Epigastric pain     GERD (gastroesophageal reflux disease)     Hypertension      Past Surgical History:   Procedure Laterality Date    NO PAST SURGERIES       Social History   History   Alcohol Use No     Comment: social alcohol use     History   Drug Use No     History   Smoking Status    Never Smoker   Smokeless Tobacco    Never Used     Family History   Problem Relation Age of Onset    Cancer Father        Meds/Allergies     Prescriptions Prior to Admission   Medication    glyBURIDE (DIABETA) 5 mg tablet    insulin detemir (LEVEMIR) 100 units/mL subcutaneous injection    lisinopril (ZESTRIL) 5 mg tablet    ranitidine (ZANTAC) 150 mg tablet    PEG 3350-KCl-NaCl-NaSulf-Na Asc-C (MOVIPREP) 100 g       No Known Allergies    Objective     Blood pressure 146/78, pulse 84, temperature 98 4 °F (36 9 °C), temperature source Temporal, resp  rate 16, height 5' 6" (1 676 m), weight 79 8 kg (176 lb), SpO2 99 %  PHYSICAL EXAM    Gen: NAD  CV: RRR  CHEST: Clear  ABD: soft, NT/ND  EXT: no edema      ASSESSMENT/PLAN:  This is a 62y o  year old male here for epigastric pain and colon cancer screening  PLAN:   Procedure:  Upper endoscopy and colonoscopy

## 2018-03-19 NOTE — DISCHARGE INSTRUCTIONS
Endoscopia superior   LO QUE NECESITA SABER:   Rashid endoscopia superior también se conoce angela endoscopia gastrointestinal (GI) superior o esofagogastroduodenoscopia (EGD)  Usted podría sentirse inflamado, con gases o sentir molestia abdominal después de bryant procedimiento  Bryant garganta podría estar adolorida por 24 a 36 horas  Usted podría eructar o expulsar gas debido al aire que todavía está en bryant cuerpo  INSTRUCCIONES SOBRE [facility]:   Llame al 911 si presenta:   · Tiene dolor en el pecho o dificultad para respirar de forma repentina  Busque atención médica de inmediato si:   · Está mareado o siente que se va a desmayar  · Usted tiene dificultad para tragar  · Usted tiene dolor de garganta intenso  · Radha evacuaciones intestinales son Selinda Dueñas o negras  · Bryant abdomen está [place] y firme y usted siente dolor intenso  · Usted vomita feng  Pregúntele a bryant [place] vitaminas y minerales son adecuados para usted  · Usted se siente lleno o hinchado y no puede eructar o expulsar gas  · Usted no ha tenido rashid evacuación intestinal después de 3 días de bryant procedimiento  · Usted tiene dolor de javi  · Usted tiene fiebre o escalofríos  · Usted tiene náuseas o está vomitando  · Usted tiene salpullido o urticaria  · Usted tiene preguntas o inquietudes acerca de bryant endoscopia  Alivie el dolor de garganta:  Chupe pastillas para la garganta o hielo triturado  Valente gárgaras con rashid pequeña cantidad de agua tibia con sal  Mezcle 1 cucharadita de sal y 1 taza de agua tibia para hacer agua salada  Alivie el gas y la molestia de la inflamación:  Acuéstese sobre bryant costado derecho con rashid almohada térmica sobre bryant abdomen  Camine un poco para ayudar a expulsar el gas  Coma comidas pequeñas hasta que se alivie de la inflamación  Descanse después de bryant procedimiento:  No maneje o tome decisiones importantes hasta el día siguiente de bryant procedimiento   Regrese a radha actividades normales según le indiquen  Usted generalmente puede regresar al [place] al día siguiente de berry procedimiento  Acuda a radha consultas de control con berry médico según le indicaron  Anote radha preguntas para que se acuerde de hacerlas celia radha visitas  © 2017 [address] Information is for End User's use only and may not be sold, redistributed or otherwise used for commercial purposes  All illustrations and images included in CareNotes® are the copyrighted property of A D A M , Inc  or Joseph Schofield  Esta información es sólo para uso en educación  Berry intención no es darle un consejo médico sobre enfermedades o tratamientos  Colsulte con berry Ozie Sharp farmacéutico antes de seguir cualquier régimen médico para saber si es seguro y efectivo para usted  Colonoscopia   LO QUE NECESITA SABER:   Rashid colonoscopia es un procedimiento para examinar con un endoscopio el interior de berry colon (intestino)  Celia rashid colonoscopia, es posible que le retiren pólipos o crecimientos de tejidos  Es normal que se sienta inflamado o que tenga molestia abdominal  Usted debería estar expulsando los gases  Si tiene hemorroides o si le removieron pólipos, usted podría presentar rashid pequeña cantidad de sangrado  INSTRUCCIONES SOBRE EL [facility]:   Busque atención médica de inmediato si:   · Usted presenta rashid cantidad jeronimo de feng carmine brillante en radha evacuaciones intestinales  · Berry abdomen está [place] y firme y usted siente dolor intenso  · Usted tiene dificultad repentina para respirar  Pregúntele a berry [place] vitaminas y minerales son adecuados para usted  · Usted presenta sarpullido o urticaria  · Usted tiene fiebre dentro de las 24 horas después de berry procedimiento       · Usted no ha tenido rashid evacuación intestinal después de 3 días de berry procedimiento  · Usted tiene preguntas o inquietudes acerca de berry condición o cuidado    Actividad:   · No levante nada, no se esfuerce o corra  por 3 días después de berry procedimiento  · Descanse después de berry procedimiento  A usted le alvarenga administrado medicamento para relajarse  No  maneje o tome decisiones importantes hasta el día siguiente de berry procedimiento  Regrese a radha actividades normales según le indiquen  · Alivie los gases y la incomodidad de la inflamación  acostándose en berry costado derecho con rashid almohada térmica sobre berry abdomen  Es posible que necesite caminar un poco para ayudar a eliminar los gases  Coma comidas pequeñas hasta que se alivie de la inflamación  Si a usted le removieron pólipos:  Por 7 días después de berry procedimiento:  · No  tome aspirina  · No  realice paseos largos en [place]  Acuda a radha consultas de control con berry médico según le indicaron  Anote radha preguntas para que se acuerde de hacerlas celia radha visitas  © 2017 geri Gauthiernijoan  Information is for End User's use only and may not be sold, redistributed or otherwise used for commercial purposes  All illustrations and images included in CareNotes® are the copyrighted property of A D A M , Inc  or Joseph Schofield  Esta información es sólo para uso en educación  Berry intención no es darle un consejo médico sobre enfermedades o tratamientos  Colsulte con berry Laruth Jag farmacéutico antes de seguir cualquier régimen médico para saber si es seguro y efectivo para usted  Pólipos colorrectales   LO QUE NECESITA SABER:   ¿Qué son los pólipos colorrectales? Los pólipos colorrectales son pequeñas protuberancias de tejido que se encuentran en el forro del colon y recto  Aubree Starcher de los pólipos son hiperplásticos y típicamente son benignos (no cancerosos)  Ciertos tipos de pólipos llamados adenomatosos, podrían convertirse en cáncer  ¿Qué aumenta mi riesgo de pólipos colorrectales? La causa exacta de los pólipos colorrectales no se conoce   [address] [place] [address] berry riesgo:  · Edad avanzada    · Rashid dieta de alimentos altos en grasa y bajos en fibra     · Antecedente familiar de pólipos    · Enfermedades intestinales angela enfermedad de Chron o colitis ulcerativa    · Un estilo de susanne no saludable angela falta de [place], fumar o beber alcohol    · Obesidad  ¿Cuáles son los signos y síntomas de los pólipos colorrectales? · Feng en radha evacuaciones intestinales o sangrado proveniente de bryant recto    · Cambios en los hábitos de defecación, angela diarrea y estreñimiento    · Dolor abdominal  ¿Cómo se diagnostican los pólipos colorrectales? Debe hacerse rashid prueba de feng fecal para la detección de enfermedades colorrectales si usted tiene más de 48 años de [place]  Se recomienda rashid evaluación antes si usted sufre de rashid enfermedad intestinal o si tiene antecedentes familiares de cáncer colorrectal o de pólipos  Eduardo esta evaluación, revisan [place] Spell de bryant materia fecal para feng, lo cual puede ser indicativo de rashid señal temprana de cáncer o pólipos  Es posible que también deba hacerse alguno de los siguientes exámenes:  · Examen digital del recto:  Bryant médico le examinará el ano y utilizará un dedo para revisar bryant recto en búsqueda de pólipos  · Enema del bario: El enema opaco es rashid radiografía del colon  Se coloca un tubo en bryant ano y se pone un líquido llamado bario por el tubo  El bario se [place] para que los médicos puedan jennifer el colon mejor en la radiografía  · Colonoscopia virtual:  Esta es rashid tomografía computarizada que rene imágenes del interior de bryant colon y recto  Se inserta un tubo pequeño y flexible en bryant recto e introducen dióxido de carbono (gas) para expandir bryant colon  [place] permite que los médicos vean claramente bryant colon y cualquier pólipo en un monitor  · Colonoscopia o sigmoidoscopia:  Estos procedimientos ayudan a que el médico alondra interior de bryant colon, mediante el uso de un tubo flexible con Tamera Dooley Jason en bryant extremidad  Celia rashid sigmoidoscopia, el médico le revisará el recto y la parte inferior de bryant colon  Celia rashid colonoscopía, los médicos revisan el colon en bryant totalidad  Es probable que los médicos extraigan rashid pequeña cantidad de tejido del colon para realizar rashid biopsia  ¿Cómo se tratan los pólipos colorrectales? · Remoción de pólipos: Se podrían remover pólipos celia bryant colonoscopía o sigmoidoscopía  · Polipectomía:  Esta es rashid cirugía para remover radha pólipos  Usted podría necesitar cirugía abierta o laparoscópica, dependiendo del tipo, tamaño y número de pólipos que tenga  La laparoscopía se realiza al insertar un endoscopio pequeño y flexible en las incisiones que le hicieron en el abdomen  Celia la [place], se hace rashid incisión más jeronimo en bryant abdomen  ¿Cuáles son los riesgos de los pólipos colorrectales? Es probable que [employer] procedimiento de la colonoscopía  Bryant intestino podría perforarse (desgarrarse) al remover los pólipos  [place] se podría llevar a rashid cirugía abierta abdominal  Celia la cirugía, usted podría sangrar demasiado o contraer rashid infección  Los pólipos adenomatosos que no se remueven podrían convertirse en cáncer y ser [employer] difíciles de tratar  ¿Dónde puedo obtener [de-identified] y [employer] información? · Germán Jones (NDDI)  [address], [place] [address]  Phone: [phone]  Web Address: [facility] gov  ¿Cuándo melissa comunicarme con mi médico?   · Usted tiene fiebre  · Usted tiene escalofríos, tos o se siente débil y adolorido  · Usted tiene dolor abdominal que no desaparece o aumenta después de fer bryant medicamento  · Bryant abdomen se encuentra inflamado  · Usted pierde peso sin proponérselo  · Usted tiene preguntas o inquietudes acerca de bryant condición o cuidado  ¿Cuándo melissa buscar atención inmediata o llamar al 911? · Usted tiene falta de aire repentina       · Tiene el ritmo cardíaco acelerado, la respiración acelerada o está demasiado mareado o débil para pararse  · Usted tiene dolor abdominal intenso  · Usted ve feng en radha deposiciones  ACUERDOS SOBRE SALAZAR CUIDADO:   Usted tiene el derecho de ayudar a planear salazar cuidado  Aprenda todo lo que pueda sobre salazar condición y angela darle tratamiento  Discuta radha opciones de tratamiento con radha médicos para decidir el cuidado que usted desea recibir  Usted siempre tiene el derecho de rechazar el tratamiento  Esta información es sólo para uso en educación  Salazar intención no es darle un consejo médico sobre enfermedades o tratamientos  Colsulte con salazar Freeda Callahan farmacéutico antes de seguir cualquier régimen médico para saber si es seguro y efectivo para usted  © 2017 [address] [facility] Information is for End User's use only and may not be sold, redistributed or otherwise used for commercial purposes  All illustrations and images included in CareNotes® are the copyrighted property of A JUAQUIN A YULISSA , Inc  or Joseph Schofield

## 2018-03-19 NOTE — ANESTHESIA POSTPROCEDURE EVALUATION
Post-Op Assessment Note      CV Status:  Stable    Mental Status:  Alert and awake    Hydration Status:  Euvolemic    PONV Controlled:  Controlled    Airway Patency:  Patent    Post Op Vitals Reviewed: Yes          Staff: Anesthesiologist           /59 (03/19/18 1004)    Temp      Pulse 98 (03/19/18 1004)   Resp 15 (03/19/18 1004)    SpO2 96 % (03/19/18 1004)

## 2018-03-25 NOTE — PROGRESS NOTES
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

## 2018-04-19 DIAGNOSIS — E11.8 CONTROLLED DIABETES MELLITUS TYPE 2 WITH COMPLICATIONS, UNSPECIFIED WHETHER LONG TERM INSULIN USE (HCC): Primary | ICD-10-CM

## 2018-04-19 DIAGNOSIS — R10.13 EPIGASTRIC ABDOMINAL PAIN: ICD-10-CM

## 2018-04-19 RX ORDER — LISINOPRIL 5 MG/1
5 TABLET ORAL DAILY
Qty: 30 TABLET | Refills: 5 | Status: SHIPPED | OUTPATIENT
Start: 2018-04-19 | End: 2018-10-08 | Stop reason: SDUPTHER

## 2018-04-19 RX ORDER — GLYBURIDE 5 MG/1
TABLET ORAL
Qty: 60 TABLET | Refills: 3 | Status: SHIPPED | OUTPATIENT
Start: 2018-04-19 | End: 2018-08-24 | Stop reason: SDUPTHER

## 2018-04-19 RX ORDER — RANITIDINE 150 MG/1
TABLET ORAL
Qty: 60 TABLET | Refills: 1 | Status: SHIPPED | OUTPATIENT
Start: 2018-04-19 | End: 2018-11-05

## 2018-04-24 DIAGNOSIS — E11.8 CONTROLLED DIABETES MELLITUS TYPE 2 WITH COMPLICATIONS, UNSPECIFIED WHETHER LONG TERM INSULIN USE (HCC): Primary | ICD-10-CM

## 2018-04-24 RX ORDER — BLOOD SUGAR DIAGNOSTIC
1 STRIP MISCELLANEOUS
Qty: 100 EACH | Refills: 5 | Status: SHIPPED | OUTPATIENT
Start: 2018-04-24 | End: 2019-05-06

## 2018-04-27 LAB
ALBUMIN SERPL-MCNC: 4.5 G/DL (ref 3.6–5.1)
ALBUMIN/CREAT UR: 33 MCG/MG CREAT
ALBUMIN/GLOB SERPL: 1.8 (CALC) (ref 1–2.5)
ALP SERPL-CCNC: 60 U/L (ref 40–115)
ALT SERPL-CCNC: 22 U/L (ref 9–46)
AST SERPL-CCNC: 19 U/L (ref 10–35)
BILIRUB SERPL-MCNC: 0.4 MG/DL (ref 0.2–1.2)
BUN SERPL-MCNC: 27 MG/DL (ref 7–25)
BUN/CREAT SERPL: 24 (CALC) (ref 6–22)
CALCIUM SERPL-MCNC: 10.3 MG/DL (ref 8.6–10.3)
CHLORIDE SERPL-SCNC: 104 MMOL/L (ref 98–110)
CHOLEST SERPL-MCNC: 205 MG/DL
CHOLEST/HDLC SERPL: 5.1 (CALC)
CO2 SERPL-SCNC: 26 MMOL/L (ref 20–31)
CREAT SERPL-MCNC: 1.14 MG/DL (ref 0.7–1.33)
CREAT UR-MCNC: 72 MG/DL (ref 20–370)
GLOBULIN SER CALC-MCNC: 2.5 G/DL (CALC) (ref 1.9–3.7)
GLUCOSE SERPL-MCNC: 82 MG/DL (ref 65–99)
HBA1C MFR BLD: 5.6 % OF TOTAL HGB
HDLC SERPL-MCNC: 40 MG/DL
LDLC SERPL CALC-MCNC: 128 MG/DL (CALC)
MICROALBUMIN UR-MCNC: 2.4 MG/DL
NONHDLC SERPL-MCNC: 165 MG/DL (CALC)
POTASSIUM SERPL-SCNC: 4 MMOL/L (ref 3.5–5.3)
PROT SERPL-MCNC: 7 G/DL (ref 6.1–8.1)
SL AMB EGFR AFRICAN AMERICAN: 82 ML/MIN/1.73M2
SL AMB EGFR NON AFRICAN AMERICAN: 70 ML/MIN/1.73M2
SODIUM SERPL-SCNC: 140 MMOL/L (ref 135–146)
TRIGL SERPL-MCNC: 220 MG/DL

## 2018-05-30 NOTE — PROGRESS NOTES
Assessment/Plan:    No problem-specific Assessment & Plan notes found for this encounter  Diagnoses and all orders for this visit:    Diabetes mellitus, new onset ([address] )  -     HEMOGLOBIN A1C W/ EAG ESTIMATION; Future  -     HEMOGLOBIN A1C W/ EAG ESTIMATION; Future  -     Basic metabolic panel; Future  -     Lipid Panel with Direct LDL reflex; Future  -     atorvastatin (LIPITOR) 20 mg tablet; Take 1 tablet (20 mg total) by mouth daily    Hypokalemia  -     Basic metabolic panel; Future  -     Lipid Panel with Direct LDL reflex; Future    Hypernatremia  -     Basic metabolic panel; Future  -     Lipid Panel with Direct LDL reflex; Future    Hyperlipidemia, unspecified hyperlipidemia type  -     Basic metabolic panel; Future  -     Lipid Panel with Direct LDL reflex; Future  -     atorvastatin (LIPITOR) 20 mg tablet; Take 1 tablet (20 mg total) by mouth daily        Stop taking Levemir  Continue with glyburide  Monitor blood sugars twice a day, in the morning fasting and in the evening postprandial   If sugars less than 70 please contact me for any further medicine adjustments  Hydrate well  Follow-up 4 months/ labs    Subjective:   Pt here for 3 month follow up visit  Labs done 4/26/18  Pt needs FOOT EXAM TODAY       Patient ID: Bessy Winston is a 62 y o  male  HPI  Patient presents for 3 month follow-up diabetes with labs  Diabetes-he is A1c improved from 10 9 now 5 6   He is currently taking glyburide 5 mg twice a day with meals, and Levemir 15 units at bedtime  He is also taking ACE-inhibitor  His sugar block today shows fasting sugars in the 70-1 100s, and about 3 episodes of low blood sugar in the 60s  He denies having any symptoms on those episodes, denies any weakness, no dizziness or any hypoglycemia symptoms  He works 3rd shift, he denies skipping meals  GERD-taking Zantac as needed        The following portions of the patient's history were reviewed and updated as appropriate: allergies, current medications, past family history, past medical history, past social history, past surgical history and problem list     Review of Systems   Constitutional: Negative for activity change and appetite change  Respiratory: Negative  Cardiovascular: Negative  Gastrointestinal: Negative  Genitourinary: Negative  Musculoskeletal: Negative for arthralgias  Skin: Negative for rash  Psychiatric/Behavioral: Negative  Objective:      /78   Pulse 91   Temp 98 2 °F (36 8 °C)   Resp 16   Ht 5' 5 75" (1 67 m)   Wt 80 7 kg (178 lb)   SpO2 98%   BMI 28 95 kg/m²          Physical Exam   Constitutional: He is oriented to person, place, and time  He appears well-developed and well-nourished  HENT:   Head: Normocephalic  Eyes: Conjunctivae are normal    Cardiovascular: Normal rate, regular rhythm and normal heart sounds  Pulses:       Dorsalis pedis pulses are 2+ on the right side, and 2+ on the left side  Posterior tibial pulses are 2+ on the right side, and 2+ on the left side  Pulmonary/Chest: Effort normal and breath sounds normal  No respiratory distress  He has no wheezes  He has no rales  Feet:   Right Foot:   Skin Integrity: Negative for ulcer, skin breakdown, erythema, warmth, callus or dry skin  Left Foot:   Skin Integrity: Negative for ulcer, skin breakdown, erythema, warmth, callus or dry skin  Neurological: He is alert and oriented to person, place, and time  Psychiatric: He has a normal mood and affect  His behavior is normal          Patient's shoes and socks removed  Right Foot/Ankle   Right Foot Inspection  Skin Exam: skin normal and skin intact no dry skin, no warmth, no callus, no erythema, no maceration, no abnormal color, no pre-ulcer, no ulcer and no callus                          Toe Exam: ROM and strength within normal limits  Sensory       Monofilament testing: intact  Vascular    The right DP pulse is 2+  The right PT pulse is 2+  Left Foot/Ankle  Left Foot Inspection  Skin Exam: skin normal and skin intactno dry skin, no warmth, no erythema, no maceration, normal color, no pre-ulcer, no ulcer and no callus                         Toe Exam: ROM and strength within normal limits                   Sensory       Monofilament: intact  Vascular    The left DP pulse is 2+  The left PT pulse is 2+  Assign Risk Category:  No deformity present; No loss of protective sensation;        Risk: 0      Recent Results (from the past 1008 hour(s))   Lipid panel    Collection Time: 04/26/18  8:00 AM   Result Value Ref Range    Total Cholesterol 205 (H) <200 mg/dL    SL AMB HDL CHOLESTEROL 40 (L) >40 mg/dL    Triglycerides 220 (H) <150 mg/dL    SL AMB LDL-CHOLESTEROL 128 (H) mg/dL (calc)    SL AMB CHOL/HDLC RATIO 5 1 (H) <5 0 (calc)    SL AMB NON HDL CHOLESTEROL 165 (H) <130 mg/dL (calc)   Comprehensive metabolic panel    Collection Time: 04/26/18  8:00 AM   Result Value Ref Range    SL AMB GLUCOSE 82 65 - 99 mg/dL    BUN 27 (H) 7 - 25 mg/dL    Creatinine, Serum 1 14 0 70 - 1 33 mg/dL    eGFR Non  70 > OR = 60 mL/min/1 73m2    SL AMB EGFR  82 > OR = 60 mL/min/1 73m2    SL AMB BUN/CREATININE RATIO 24 (H) 6 - 22 (calc)    SL AMB SODIUM 140 135 - 146 mmol/L    SL AMB POTASSIUM 4 0 3 5 - 5 3 mmol/L    SL AMB CHLORIDE 104 98 - 110 mmol/L    SL AMB CARBON DIOXIDE 26 20 - 31 mmol/L    SL AMB CALCIUM 10 3 8 6 - 10 3 mg/dL    SL AMB PROTEIN, TOTAL 7 0 6 1 - 8 1 g/dL    Serum Albumin 4 5 3 6 - 5 1 g/dL    SL AMB GLOBULIN 2 5 1 9 - 3 7 g/dL (calc)    SL AMB ALBUMIN/GLOBULIN RATIO 1 8 1 0 - 2 5 (calc)    SL AMB BILIRUBIN, TOTAL 0 4 0 2 - 1 2 mg/dL    SL AMB ALKALINE PHOSPHATASE 60 40 - 115 U/L    SL AMB AST 19 10 - 35 U/L    SL AMB ALT 22 9 - 46 U/L   Microalbumin, Random Urine (W/Creatinine)    Collection Time: 04/26/18  8:00 AM   Result Value Ref Range    Creatinine, Urine 72 20 - 370 mg/dL    Microalbum  ,U,Random 2 4 See Note: mg/dL Microalb/Creat Ratio 33 (H) <30 mcg/mg creat   Hemoglobin A1c    Collection Time: 04/26/18  8:00 AM   Result Value Ref Range    Hemoglobin A1C 5 6 <5 7 % of total Hgb

## 2018-06-04 ENCOUNTER — OFFICE VISIT (OUTPATIENT)
Dept: FAMILY MEDICINE CLINIC | Facility: CLINIC | Age: 58
End: 2018-06-04
Payer: COMMERCIAL

## 2018-06-04 VITALS
OXYGEN SATURATION: 98 % | HEART RATE: 91 BPM | HEIGHT: 66 IN | SYSTOLIC BLOOD PRESSURE: 118 MMHG | DIASTOLIC BLOOD PRESSURE: 78 MMHG | BODY MASS INDEX: 28.61 KG/M2 | TEMPERATURE: 98.2 F | RESPIRATION RATE: 16 BRPM | WEIGHT: 178 LBS

## 2018-06-04 DIAGNOSIS — E78.5 HYPERLIPIDEMIA, UNSPECIFIED HYPERLIPIDEMIA TYPE: ICD-10-CM

## 2018-06-04 DIAGNOSIS — E87.6 HYPOKALEMIA: ICD-10-CM

## 2018-06-04 DIAGNOSIS — E11.9 DIABETES MELLITUS, NEW ONSET (HCC): Primary | ICD-10-CM

## 2018-06-04 DIAGNOSIS — E87.0 HYPERNATREMIA: ICD-10-CM

## 2018-06-04 PROBLEM — N17.9 ACUTE KIDNEY INJURY (HCC): Status: RESOLVED | Noted: 2017-12-18 | Resolved: 2018-06-04

## 2018-06-04 PROCEDURE — 3008F BODY MASS INDEX DOCD: CPT | Performed by: FAMILY MEDICINE

## 2018-06-04 PROCEDURE — 99214 OFFICE O/P EST MOD 30 MIN: CPT | Performed by: FAMILY MEDICINE

## 2018-06-04 RX ORDER — ATORVASTATIN CALCIUM 20 MG/1
20 TABLET, FILM COATED ORAL DAILY
Qty: 90 TABLET | Refills: 1 | Status: SHIPPED | OUTPATIENT
Start: 2018-06-04 | End: 2018-10-08 | Stop reason: SDUPTHER

## 2018-08-24 DIAGNOSIS — E11.8 CONTROLLED DIABETES MELLITUS TYPE 2 WITH COMPLICATIONS, UNSPECIFIED WHETHER LONG TERM INSULIN USE (HCC): ICD-10-CM

## 2018-08-24 RX ORDER — GLYBURIDE 5 MG/1
TABLET ORAL
Qty: 60 TABLET | Refills: 1 | Status: SHIPPED | OUTPATIENT
Start: 2018-08-24 | End: 2018-10-08 | Stop reason: SDUPTHER

## 2018-09-28 LAB
BUN SERPL-MCNC: 18 MG/DL (ref 7–25)
BUN/CREAT SERPL: ABNORMAL (CALC) (ref 6–22)
CALCIUM SERPL-MCNC: 9.6 MG/DL (ref 8.6–10.3)
CHLORIDE SERPL-SCNC: 105 MMOL/L (ref 98–110)
CHOLEST SERPL-MCNC: 143 MG/DL
CHOLEST/HDLC SERPL: 3.3 (CALC)
CO2 SERPL-SCNC: 27 MMOL/L (ref 20–32)
CREAT SERPL-MCNC: 1.27 MG/DL (ref 0.7–1.33)
EST. AVERAGE GLUCOSE BLD GHB EST-MCNC: 128 (CALC)
EST. AVERAGE GLUCOSE BLD GHB EST-SCNC: 7.1 (CALC)
GLUCOSE SERPL-MCNC: 100 MG/DL (ref 65–99)
HBA1C MFR BLD: 6.1 % OF TOTAL HGB
HDLC SERPL-MCNC: 43 MG/DL
LDLC SERPL CALC-MCNC: 69 MG/DL (CALC)
NONHDLC SERPL-MCNC: 100 MG/DL (CALC)
POTASSIUM SERPL-SCNC: 4.4 MMOL/L (ref 3.5–5.3)
SL AMB EGFR AFRICAN AMERICAN: 72 ML/MIN/1.73M2
SL AMB EGFR NON AFRICAN AMERICAN: 62 ML/MIN/1.73M2
SODIUM SERPL-SCNC: 140 MMOL/L (ref 135–146)
TRIGL SERPL-MCNC: 225 MG/DL

## 2018-10-08 DIAGNOSIS — E11.8 CONTROLLED DIABETES MELLITUS TYPE 2 WITH COMPLICATIONS, UNSPECIFIED WHETHER LONG TERM INSULIN USE (HCC): Primary | ICD-10-CM

## 2018-10-08 DIAGNOSIS — E78.5 HYPERLIPIDEMIA, UNSPECIFIED HYPERLIPIDEMIA TYPE: ICD-10-CM

## 2018-10-08 DIAGNOSIS — E11.9 DIABETES MELLITUS, NEW ONSET (HCC): ICD-10-CM

## 2018-10-08 DIAGNOSIS — E11.8 CONTROLLED DIABETES MELLITUS TYPE 2 WITH COMPLICATIONS, UNSPECIFIED WHETHER LONG TERM INSULIN USE (HCC): ICD-10-CM

## 2018-10-08 RX ORDER — ATORVASTATIN CALCIUM 20 MG/1
20 TABLET, FILM COATED ORAL DAILY
Qty: 90 TABLET | Refills: 1 | Status: SHIPPED | OUTPATIENT
Start: 2018-10-08 | End: 2018-11-05 | Stop reason: SDUPTHER

## 2018-10-08 RX ORDER — LISINOPRIL 5 MG/1
5 TABLET ORAL DAILY
Qty: 90 TABLET | Refills: 1 | Status: SHIPPED | OUTPATIENT
Start: 2018-10-08 | End: 2018-10-29 | Stop reason: SDUPTHER

## 2018-10-08 RX ORDER — GLYBURIDE 5 MG/1
TABLET ORAL
Qty: 180 TABLET | Refills: 1 | Status: SHIPPED | OUTPATIENT
Start: 2018-10-08 | End: 2018-11-05 | Stop reason: SDUPTHER

## 2018-10-08 NOTE — TELEPHONE ENCOUNTER
atorvastatin (LIPITOR) 20 mg tablet  glyBURIDE (DIABETA) 5 mg tablet  lisinopril (ZESTRIL) 5 mg tablet    All rx to danielle farrell [place]

## 2018-10-28 DIAGNOSIS — E11.8 CONTROLLED DIABETES MELLITUS TYPE 2 WITH COMPLICATIONS, UNSPECIFIED WHETHER LONG TERM INSULIN USE (HCC): ICD-10-CM

## 2018-10-29 PROCEDURE — 4010F ACE/ARB THERAPY RXD/TAKEN: CPT | Performed by: FAMILY MEDICINE

## 2018-10-29 RX ORDER — GLYBURIDE 5 MG/1
TABLET ORAL
Qty: 60 TABLET | Refills: 0 | OUTPATIENT
Start: 2018-10-29

## 2018-10-29 RX ORDER — LISINOPRIL 5 MG/1
TABLET ORAL
Qty: 30 TABLET | Refills: 0 | Status: SHIPPED | OUTPATIENT
Start: 2018-10-29 | End: 2018-11-05 | Stop reason: SDUPTHER

## 2018-11-02 NOTE — PROGRESS NOTES
Assessment/Plan:    No problem-specific Assessment & Plan notes found for this encounter  Diagnoses and all orders for this visit:    Need for influenza vaccination  -     influenza vaccine, [id number], quadrivalent, recombinant, PF, 0 5 mL, for patients 18 yr+ ([facility])  -     Microalbumin / creatinine urine ratio  -     HEMOGLOBIN A1C W/ EAG ESTIMATION; Future  -     Basic metabolic panel; Future  -     Lipid Panel with Direct LDL reflex; Future    Diabetes mellitus, new onset ([facility] [address] )  -     atorvastatin (LIPITOR) 20 mg tablet; Take 1 tablet (20 mg total) by mouth daily  -     Microalbumin / creatinine urine ratio  -     HEMOGLOBIN A1C W/ EAG ESTIMATION; Future  -     Basic metabolic panel; Future  -     Lipid Panel with Direct LDL reflex; Future    Hyperlipidemia, unspecified hyperlipidemia type  -     atorvastatin (LIPITOR) 20 mg tablet; Take 1 tablet (20 mg total) by mouth daily  -     Microalbumin / creatinine urine ratio  -     HEMOGLOBIN A1C W/ EAG ESTIMATION; Future  -     Basic metabolic panel; Future  -     Lipid Panel with Direct LDL reflex; Future    Controlled diabetes mellitus type 2 with complications, unspecified whether long term insulin use (HCC)  -     lisinopril (ZESTRIL) 5 mg tablet; Take 1 tablet (5 mg total) by mouth daily  -     glyBURIDE (DIABETA) 5 mg tablet; Take 1 tab twice daily with meals  -     Microalbumin / creatinine urine ratio  -     HEMOGLOBIN A1C W/ EAG ESTIMATION; Future  -     Basic metabolic panel; Future  -     Lipid Panel with Direct LDL reflex; Future        Fu 6 months + labs    Subjective:   Pt here for follow up visit  Labs done 9/27/18  Pt will receive flu vaccine     Patient ID: Nader Moon is a 62 y o  male  HPI     Pt presents for chronic cond follow UP  Labs done 9/27, reviewed and discussed with pt  DM- a1c stable 6 1  Denies any hypoglycemia or hyperglycemia issues  Eye exam done this summer       The following portions of the patient's history were reviewed and updated as appropriate: allergies, current medications, past family history, past medical history, past social history, past surgical history and problem list     Review of Systems   Constitutional: Negative for activity change and appetite change  HENT: Negative  Eyes: Negative  Respiratory: Negative  Cardiovascular: Negative  Gastrointestinal: Negative  Genitourinary: Negative  Musculoskeletal: Negative for arthralgias  Skin: Negative for rash  Psychiatric/Behavioral: Negative  Objective:      /78   Pulse 71   Temp 98 3 °F (36 8 °C)   Ht 5' 5 75" (1 67 m)   Wt 84 4 kg (186 lb)   SpO2 97%   BMI 30 25 kg/m²        Physical Exam   Constitutional: He is oriented to person, place, and time  He appears well-developed and well-nourished  No distress  HENT:   Head: Normocephalic  Eyes: Conjunctivae are normal    Cardiovascular: Normal rate, regular rhythm and normal heart sounds  Pulmonary/Chest: Effort normal and breath sounds normal  No respiratory distress  He has no wheezes  He has no rales  Musculoskeletal: He exhibits no edema  Neurological: He is alert and oriented to person, place, and time  Psychiatric: He has a normal mood and affect   His behavior is normal

## 2018-11-05 ENCOUNTER — OFFICE VISIT (OUTPATIENT)
Dept: FAMILY MEDICINE CLINIC | Facility: CLINIC | Age: 58
End: 2018-11-05
Payer: COMMERCIAL

## 2018-11-05 VITALS
HEART RATE: 71 BPM | TEMPERATURE: 98.3 F | OXYGEN SATURATION: 97 % | DIASTOLIC BLOOD PRESSURE: 78 MMHG | HEIGHT: 66 IN | BODY MASS INDEX: 29.89 KG/M2 | WEIGHT: 186 LBS | SYSTOLIC BLOOD PRESSURE: 130 MMHG

## 2018-11-05 DIAGNOSIS — E78.5 HYPERLIPIDEMIA, UNSPECIFIED HYPERLIPIDEMIA TYPE: ICD-10-CM

## 2018-11-05 DIAGNOSIS — E11.9 DIABETES MELLITUS, NEW ONSET (HCC): ICD-10-CM

## 2018-11-05 DIAGNOSIS — E11.8 CONTROLLED DIABETES MELLITUS TYPE 2 WITH COMPLICATIONS, UNSPECIFIED WHETHER LONG TERM INSULIN USE (HCC): ICD-10-CM

## 2018-11-05 DIAGNOSIS — Z23 NEED FOR INFLUENZA VACCINATION: Primary | ICD-10-CM

## 2018-11-05 PROCEDURE — 3008F BODY MASS INDEX DOCD: CPT | Performed by: FAMILY MEDICINE

## 2018-11-05 PROCEDURE — 90682 RIV4 VACC RECOMBINANT DNA IM: CPT

## 2018-11-05 PROCEDURE — 4010F ACE/ARB THERAPY RXD/TAKEN: CPT | Performed by: FAMILY MEDICINE

## 2018-11-05 PROCEDURE — 99214 OFFICE O/P EST MOD 30 MIN: CPT | Performed by: FAMILY MEDICINE

## 2018-11-05 PROCEDURE — 90471 IMMUNIZATION ADMIN: CPT

## 2018-11-05 RX ORDER — ATORVASTATIN CALCIUM 20 MG/1
20 TABLET, FILM COATED ORAL DAILY
Qty: 90 TABLET | Refills: 1 | Status: SHIPPED | OUTPATIENT
Start: 2018-11-05 | End: 2019-05-06 | Stop reason: SDUPTHER

## 2018-11-05 RX ORDER — GLYBURIDE 5 MG/1
TABLET ORAL
Qty: 180 TABLET | Refills: 1 | Status: SHIPPED | OUTPATIENT
Start: 2018-11-05 | End: 2019-02-26 | Stop reason: SDUPTHER

## 2018-11-05 RX ORDER — LISINOPRIL 5 MG/1
5 TABLET ORAL DAILY
Qty: 90 TABLET | Refills: 1 | Status: SHIPPED | OUTPATIENT
Start: 2018-11-05 | End: 2019-05-06 | Stop reason: SDUPTHER

## 2018-12-10 ENCOUNTER — TELEPHONE (OUTPATIENT)
Dept: FAMILY MEDICINE CLINIC | Facility: CLINIC | Age: 58
End: 2018-12-10

## 2019-02-26 DIAGNOSIS — E11.8 CONTROLLED DIABETES MELLITUS TYPE 2 WITH COMPLICATIONS, UNSPECIFIED WHETHER LONG TERM INSULIN USE (HCC): ICD-10-CM

## 2019-02-26 RX ORDER — GLYBURIDE 5 MG/1
TABLET ORAL
Qty: 180 TABLET | Refills: 0 | Status: SHIPPED | OUTPATIENT
Start: 2019-02-26 | End: 2019-05-06 | Stop reason: SDUPTHER

## 2019-04-23 LAB
BUN SERPL-MCNC: 15 MG/DL (ref 7–25)
BUN/CREAT SERPL: ABNORMAL (CALC) (ref 6–22)
CALCIUM SERPL-MCNC: 10 MG/DL (ref 8.6–10.3)
CHLORIDE SERPL-SCNC: 104 MMOL/L (ref 98–110)
CHOLEST SERPL-MCNC: 142 MG/DL
CHOLEST/HDLC SERPL: 3.2 (CALC)
CO2 SERPL-SCNC: 27 MMOL/L (ref 20–32)
CREAT SERPL-MCNC: 1.14 MG/DL (ref 0.7–1.33)
EST. AVERAGE GLUCOSE BLD GHB EST-MCNC: 177 (CALC)
EST. AVERAGE GLUCOSE BLD GHB EST-SCNC: 9.8 (CALC)
GLUCOSE SERPL-MCNC: 141 MG/DL (ref 65–99)
HBA1C MFR BLD: 7.8 % OF TOTAL HGB
HDLC SERPL-MCNC: 45 MG/DL
LDLC SERPL CALC-MCNC: 73 MG/DL (CALC)
NONHDLC SERPL-MCNC: 97 MG/DL (CALC)
POTASSIUM SERPL-SCNC: 4.3 MMOL/L (ref 3.5–5.3)
SL AMB EGFR AFRICAN AMERICAN: 81 ML/MIN/1.73M2
SL AMB EGFR NON AFRICAN AMERICAN: 70 ML/MIN/1.73M2
SODIUM SERPL-SCNC: 140 MMOL/L (ref 135–146)
TRIGL SERPL-MCNC: 161 MG/DL

## 2019-05-06 ENCOUNTER — OFFICE VISIT (OUTPATIENT)
Dept: FAMILY MEDICINE CLINIC | Facility: CLINIC | Age: 59
End: 2019-05-06
Payer: COMMERCIAL

## 2019-05-06 VITALS
HEART RATE: 108 BPM | DIASTOLIC BLOOD PRESSURE: 84 MMHG | BODY MASS INDEX: 31.39 KG/M2 | RESPIRATION RATE: 16 BRPM | TEMPERATURE: 98.4 F | WEIGHT: 188.4 LBS | HEIGHT: 65 IN | SYSTOLIC BLOOD PRESSURE: 130 MMHG | OXYGEN SATURATION: 98 %

## 2019-05-06 DIAGNOSIS — E11.8 CONTROLLED DIABETES MELLITUS TYPE 2 WITH COMPLICATIONS, UNSPECIFIED WHETHER LONG TERM INSULIN USE (HCC): Primary | ICD-10-CM

## 2019-05-06 DIAGNOSIS — E78.5 HYPERLIPIDEMIA, UNSPECIFIED HYPERLIPIDEMIA TYPE: ICD-10-CM

## 2019-05-06 DIAGNOSIS — E11.9 DIABETES MELLITUS, NEW ONSET (HCC): ICD-10-CM

## 2019-05-06 PROBLEM — H53.8 BLURRED VISION: Status: RESOLVED | Noted: 2017-12-18 | Resolved: 2019-05-06

## 2019-05-06 LAB
CREAT UR-MCNC: 78.8 MG/DL
MICROALBUMIN UR-MCNC: 124 MG/L (ref 0–20)
MICROALBUMIN/CREAT 24H UR: 157 MG/G CREATININE (ref 0–30)

## 2019-05-06 PROCEDURE — 4010F ACE/ARB THERAPY RXD/TAKEN: CPT | Performed by: NURSE PRACTITIONER

## 2019-05-06 PROCEDURE — 82043 UR ALBUMIN QUANTITATIVE: CPT | Performed by: NURSE PRACTITIONER

## 2019-05-06 PROCEDURE — 82570 ASSAY OF URINE CREATININE: CPT | Performed by: NURSE PRACTITIONER

## 2019-05-06 PROCEDURE — 3008F BODY MASS INDEX DOCD: CPT | Performed by: NURSE PRACTITIONER

## 2019-05-06 PROCEDURE — 3060F POS MICROALBUMINURIA REV: CPT | Performed by: NURSE PRACTITIONER

## 2019-05-06 PROCEDURE — 99214 OFFICE O/P EST MOD 30 MIN: CPT | Performed by: NURSE PRACTITIONER

## 2019-05-06 RX ORDER — LISINOPRIL 5 MG/1
5 TABLET ORAL DAILY
Qty: 90 TABLET | Refills: 1 | Status: SHIPPED | OUTPATIENT
Start: 2019-05-06 | End: 2019-05-07

## 2019-05-06 RX ORDER — GLYBURIDE 5 MG/1
TABLET ORAL
Qty: 180 TABLET | Refills: 1 | Status: SHIPPED | OUTPATIENT
Start: 2019-05-06 | End: 2019-11-04 | Stop reason: SDUPTHER

## 2019-05-06 RX ORDER — ATORVASTATIN CALCIUM 20 MG/1
20 TABLET, FILM COATED ORAL DAILY
Qty: 90 TABLET | Refills: 1 | Status: SHIPPED | OUTPATIENT
Start: 2019-05-06 | End: 2019-11-04 | Stop reason: SDUPTHER

## 2019-05-07 DIAGNOSIS — R80.9 PROTEINURIA, UNSPECIFIED TYPE: Primary | ICD-10-CM

## 2019-05-07 DIAGNOSIS — E11.8 CONTROLLED DIABETES MELLITUS TYPE 2 WITH COMPLICATIONS, UNSPECIFIED WHETHER LONG TERM INSULIN USE (HCC): ICD-10-CM

## 2019-05-07 RX ORDER — LISINOPRIL 10 MG/1
10 TABLET ORAL DAILY
Qty: 90 TABLET | Refills: 1 | Status: SHIPPED | OUTPATIENT
Start: 2019-05-07 | End: 2019-11-04 | Stop reason: SDUPTHER

## 2019-10-21 LAB
ALBUMIN SERPL-MCNC: 4.2 G/DL (ref 3.6–5.1)
ALBUMIN/GLOB SERPL: 1.7 (CALC) (ref 1–2.5)
ALP SERPL-CCNC: 65 U/L (ref 40–115)
ALT SERPL-CCNC: 42 U/L (ref 9–46)
AST SERPL-CCNC: 32 U/L (ref 10–35)
BILIRUB SERPL-MCNC: 0.7 MG/DL (ref 0.2–1.2)
BUN SERPL-MCNC: 24 MG/DL (ref 7–25)
BUN/CREAT SERPL: ABNORMAL (CALC) (ref 6–22)
CALCIUM SERPL-MCNC: 9.8 MG/DL (ref 8.6–10.3)
CHLORIDE SERPL-SCNC: 104 MMOL/L (ref 98–110)
CHOLEST SERPL-MCNC: 124 MG/DL
CHOLEST/HDLC SERPL: 3.4 (CALC)
CO2 SERPL-SCNC: 27 MMOL/L (ref 20–32)
CREAT SERPL-MCNC: 1.3 MG/DL (ref 0.7–1.33)
GLOBULIN SER CALC-MCNC: 2.5 G/DL (CALC) (ref 1.9–3.7)
GLUCOSE SERPL-MCNC: 137 MG/DL (ref 65–99)
HDLC SERPL-MCNC: 37 MG/DL
LDLC SERPL CALC-MCNC: 62 MG/DL (CALC)
NONHDLC SERPL-MCNC: 87 MG/DL (CALC)
POTASSIUM SERPL-SCNC: 4.1 MMOL/L (ref 3.5–5.3)
PROT SERPL-MCNC: 6.7 G/DL (ref 6.1–8.1)
SL AMB EGFR AFRICAN AMERICAN: 69 ML/MIN/1.73M2
SL AMB EGFR NON AFRICAN AMERICAN: 60 ML/MIN/1.73M2
SODIUM SERPL-SCNC: 139 MMOL/L (ref 135–146)
TRIGL SERPL-MCNC: 177 MG/DL

## 2019-11-04 ENCOUNTER — OFFICE VISIT (OUTPATIENT)
Dept: FAMILY MEDICINE CLINIC | Facility: CLINIC | Age: 59
End: 2019-11-04
Payer: COMMERCIAL

## 2019-11-04 VITALS
HEIGHT: 65 IN | SYSTOLIC BLOOD PRESSURE: 130 MMHG | HEART RATE: 59 BPM | WEIGHT: 185 LBS | RESPIRATION RATE: 18 BRPM | OXYGEN SATURATION: 99 % | BODY MASS INDEX: 30.82 KG/M2 | DIASTOLIC BLOOD PRESSURE: 90 MMHG | TEMPERATURE: 98.2 F

## 2019-11-04 DIAGNOSIS — R80.9 PROTEINURIA, UNSPECIFIED TYPE: ICD-10-CM

## 2019-11-04 DIAGNOSIS — E11.8 CONTROLLED TYPE 2 DIABETES MELLITUS WITH COMPLICATION, WITHOUT LONG-TERM CURRENT USE OF INSULIN (HCC): Primary | ICD-10-CM

## 2019-11-04 DIAGNOSIS — E78.5 HYPERLIPIDEMIA, UNSPECIFIED HYPERLIPIDEMIA TYPE: ICD-10-CM

## 2019-11-04 DIAGNOSIS — E11.8 CONTROLLED DIABETES MELLITUS TYPE 2 WITH COMPLICATIONS, UNSPECIFIED WHETHER LONG TERM INSULIN USE (HCC): ICD-10-CM

## 2019-11-04 LAB — SL AMB POCT HEMOGLOBIN AIC: 7.3 (ref ?–6.5)

## 2019-11-04 PROCEDURE — 99214 OFFICE O/P EST MOD 30 MIN: CPT | Performed by: NURSE PRACTITIONER

## 2019-11-04 PROCEDURE — 3008F BODY MASS INDEX DOCD: CPT | Performed by: NURSE PRACTITIONER

## 2019-11-04 PROCEDURE — 83036 HEMOGLOBIN GLYCOSYLATED A1C: CPT | Performed by: NURSE PRACTITIONER

## 2019-11-04 RX ORDER — GLYBURIDE 5 MG/1
TABLET ORAL
Qty: 180 TABLET | Refills: 1 | Status: SHIPPED | OUTPATIENT
Start: 2019-11-04 | End: 2020-05-11 | Stop reason: SDUPTHER

## 2019-11-04 RX ORDER — ATORVASTATIN CALCIUM 20 MG/1
20 TABLET, FILM COATED ORAL DAILY
Qty: 90 TABLET | Refills: 1 | Status: SHIPPED | OUTPATIENT
Start: 2019-11-04 | End: 2020-05-13

## 2019-11-04 RX ORDER — LISINOPRIL 10 MG/1
10 TABLET ORAL DAILY
Qty: 90 TABLET | Refills: 1 | Status: SHIPPED | OUTPATIENT
Start: 2019-11-04 | End: 2020-05-13

## 2019-11-04 NOTE — ASSESSMENT & PLAN NOTE
LDL controlled on statin  HDL < 40 - discussed essential fatty acids - like olive oil and fish oil; literature given in Urdu  Cont to monitor

## 2019-11-04 NOTE — PATIENT INSTRUCTIONS
El colesterol y bryant stephania   CUIDADO AMBULATORIO:   El colesterol  es rashid sustancia cerosa y [place] Alba  El colesterol es producido por bryant cuerpo, mark también proviene de ciertos alimentos que come  Bryant cuerpo utiliza el colesterol para producir hormonas y células nuevas  Bryant cuerpo también utiliza el colesterol para proteger los nervios  El colesterol proviene de alimentos angela la carne y los [place]  Bryant nivel de colesterol total consta de colesterol LDL, colesterol HDL y triglicéridos:  · El colesterol LDL  se denomina colesterol kev  porque forma placas en las arterias  Cuando la placa se acumula, las arterias se estrechan, y reduce el flujo de [place]  Cuando la placa reduce el flujo de la feng al corazón, es probable que usted tenga dolor en el pecho  Si la placa obstruye completamente rashid arteria que lleva feng al corazón, usted podría sufrir un ataque cardíaco  La placa puede romperse y formar coágulos de [place]  Los coágulos de feng podrían bloquear las arterias de bryant cerebro y causarle un derrame  · El colesterol HDL  se llama colesterol butler  porque ayuda a eliminar el colesterol LDL de las arterias  Lo hace al adherirse al colesterol LDL y llevarlo a bryant hígado  El hígado descompone el colesterol LDL para que bryant cuerpo pueda deshacerse de él  Los niveles altos de colesterol HDL pueden ayudar a prevenir ataques cardíacos y accidentes cerebrovasculares  Los niveles bajos de colesterol HDL pueden aumentar el riesgo de enfermedad cardíaca, ataque cardíaco y accidente cerebrovascular  · Los triglicéridos  son un tipo de grasa que almacenan la energía de los alimentos que usted come  Los [employer] de triglicéridos también causan la acumulación de placa  Converse puede aumentar el riesgo de un ataque cardíaco o derrame cerebral  Si bryant nivel de triglicéridos es alto, bryant nivel de colesterol LDL también puede ser alto    Cómo los alimentos afectan radha niveles de colesterol:   · Las grasas no saludables aumentan los niveles de colesterol LDL y triglicéridos en la feng  Estas grasas se encuentran en alimentos con alto contenido de colesterol, grasas saturadas y grasas trans:     ¨ El colesterol  se encuentra en los [place], productos lácteos y carne  ¨ Las grasas saturadas  se encuentra en la [facility] york, el queso, el helado, la [place] entera y aceite de [place]  La grasa saturada se encuentra también en carne, angela salchichas, lashae caliente, y [place]  ¨ La grasa trans  se encuentra en los aceites líquidos y se [place] en los alimentos fritos y horneados  Los alimentos que contienen grasas trans [employer] joselin fritas, galletas saladas, molletes, pan [place], palomitas de maíz de microondas y galletas dulces  · Las grasas saludables,  también llamadas grasas insaturadas, [facility] a reducir el colesterol LDL y los niveles de triglicéridos  Las grasas saludables incluyen lo siguiente:     ¨ Las grasas monoinsaturadas  se encuentran en alimentos angela el aceite de [place], aceite canola, [place]ar[place] Mill[place], nueces y [place]  ¨ Las [employer],  angela grasas Omega 3, se encuentran en pescados, angela el salmón, la trucha y [place] breez  También se pueden encontrar en alimentos vegetales angela la linaza, las nueces y soya  Otras cosas que afectan radha niveles de colesterol:   · Fumar cigarrillos    · Sobrepeso u obesidad     · [place] grandes cantidades de alcohol    · No hacer suficiente ejercicio o ningún ejercicio    · Ciertos genes pasan de radha padres a usted  Lo que necesita saber sobre el control de radha niveles de colesterol:  Los adultos de 20 a 45 años de edad deben controlar radha niveles de colesterol cada 4 a 6 años  Los adultos de 39 años y mayores deberían controlar bryant colesterol cada 1 a 2 años  Puede que necesite controlar bryant colesterol más a menudo, o a rashid edad más temprana, si tiene factores de riesgo para enfermedades del corazón   También deberá controlar bryant colesterol más a menudo si tiene otras condiciones de stephania, angela la diabetes  Los análisis de [facility] se [place] para verificar los niveles de [place]  Los análisis de feng miden los niveles de triglicéridos, colesterol LDL y colesterol HDL  Objetivos de nivel de colesterol:  Bryant meta de nivel de colesterol puede depender de bryant riesgo de enfermedad cardíaca  También puede depender de bryant edad y de otras afecciones médicas  Consulte con bryant médico si los siguientes objetivos son apropiados para usted:  · Bryant nivel de colesterol total  debe ser menos de 200 mg/dL  Josselin número también puede depender de radha objetivos de colesterol HDL y LDL  · Bryant nivel de colesterol LDL  debe ser menos de 130 mg/dL  · Bryant nivel de colesterol HDL  debe ser de 60 mg/dL o superior  · Bryant nivel de triglicéridos  debe ser menos de 150 mg/dL  Tratamiento para el colesterol alto:  El tratamiento para el colesterol alto también disminuirá bryant riesgo de enfermedad cardíaca, ataque cardíaco y accidente cerebrovascular  Es posible que deba seguir alguno de los siguientes tratamientos:  · Medicamentos,  pueden administrarse para bajar bryant colesterol LDL, triglicéridos o nivel de colesterol total  Puede necesitar medicamentos para bajar bryant colesterol si cualquiera de las siguientes afirmaciones es cierta:     ¨ Usted tiene antecedentes de accidente cerebrovascular, accidente isquémico transitorio, angina inestable o ataque cardíaco     ¨ Bryant nivel de colesterol LDL es de 190 mg/dL o superior  ¨ Usted tiene de 36 a 76 años de edad, tiene diabetes y bryant colesterol LDL es de 70 mg/dL o superior  ¨ Usted tiene de 36 a 76 años de edad, tiene factores de riesgo para enfermedades cardíacas y bryant colesterol LDL es de 70 mg/dL o superior  · Cambios en el estilo de susanne  incluidos cambios en bryant dieta, ejercicio, pérdida de peso y dejar de fumar  También incluye la disminución de la cantidad de alcohol que usted rebecca       · Suplementos:  incluyen aceite de pescado, arroz de Annabelle Foil y ajo  El aceite de pescado puede ayudar a [employer] triglicéridos y los niveles de colesterol LDL  También puede aumentar bryant nivel de colesterol HDL  El arroz de Annabelle Foil puede ayudar a disminuir bryant nivel de colesterol total y el nivel de colesterol LDL  El ajo puede ayudar a disminuir el nivel de colesterol total  No tome ningún suplemento sin antes consultar con bryant [employer] para ayudar a bajar radha niveles de colesterol:  Un dietista registrado puede ayudarle a crear un plan de alimentación saludable  Elija alimentos bajos en grasa saturada, grasas trans y colesterol  · Reduzca la cantidad total de grasa que usted consume  Elija fransico magras, leche descremada o con 1% de [place] y productos lácteos bajos en grasa, angela yogur y Renetta-barre  Trate de limitar o evitar las fransico coulter  Limite o no coma alimentos fritos o productos horneados angela galletas  · [address] grasas no saludables por grasa saludables  Cocine los alimentos en aceite de hernandez o aceite de canola  Elija margarinas suaves que magno bajas en grasas saturadas y grasas trans  Las semillas, nueces y aguacates son otros ejemplos de grasas saludables  · Coma alimentos con grasas Omega 3  Vamshi Johny son salmón, atún, [place] y semillas de ela  Coma pescado 2 veces por semana  Los niños y las mujeres embarazadas no deberían comer pescado con niveles altos de ezio, angela el tiburón, el pez gerson y pez caballa navjot  · Aumente la cantidad de alimentos de origen vegetal que come  Los alimentos de origen vegetal son bajos en colesterol y [place]  [place] más de estos alimentos puede ayudarlo a reducir bryant colesterol y bajar de [place]  Algunos ejemplos de alimentos de origen vegetal son frutas, verduras, legumbres y granos integrales  Reemplace la [employer] contiene productos lácteos con Suszanne Fine de river  Coma granos y alimentos con soja para obtener proteínas en lugar de carne   Consulte a bryant médico o dietista para obtener más información sobre alimentos de origen vegetal      · Aumente la cantidad de [place] que come  Los alimentos ricos en fibra pueden ayudar a disminuir el colesterol LDL  Usted debe consumir entre 20 y 27 gramos de fibra por día  Consuma al menos 5 porciones de frutas y verduras todos los días  Otros ejemplos de alimentos ricos en fibra incluyen panes de grano entero o integrales, pastas o cereales y arroz integral  Consuma 3 onzas de alimentos integrales al día  Aumente la fibra lentamente  Usted podría presentar malestar o inflamación estomacal y gases si añade fibra a bryant dieta demasiado rápido  Cambios de estilo de susanne que usted puede hacer para ayudar a reducir radha niveles de colesterol:   · Mantenga un peso saludable  Consulte con bryant médico cuánto debería pesar  [place] que lo ayude a crear un plan para bajar de peso si tiene sobrepeso  Bajar de peso puede reducir radha niveles del colesterol total y triglicéridos  · Realice actividad física con regularidad  El ejercicio puede ayudar a bajar bryant nivel de colesterol total y mantener un peso saludable  El ejercicio también puede ayudar a aumentar bryant nivel de colesterol HDL  Colabore con bryant médico para desarrollar un plan de ejercicios que sea adecuado para usted  Valente al menos 30 minutos de ejercicio la mayoría de los días de [place]  Algunos ejemplos de ejercicios incluyen caminar enérgicamente, nadar o andar en bicicleta  · No fume  La nicotina y otros químicos presentes en los cigarrillos y cigarros podrían provocar daño a radha pulmones, corazón y vasos sanguíneos  También pueden aumentar radha niveles de triglicéridos  Pida información a bryant médico si usted actualmente fuma y necesita ayuda para dejar de fumar  Los cigarrillos electrónicos o tabaco sin humo todavía contienen nicotina  Consulte con bryant médico antes de [employer]  · Limite o no consuma bebidas alcohólicas    El alcohol puede aumentar radha niveles de triglicéridos  Pregunte a berry médico si usted puede fer alcohol  Pregunte cuál es la cantidad prakash que puede fer por día  © 2017 [address] Information is for End User's use only and may not be sold, redistributed or otherwise used for commercial purposes  All illustrations and images included in CareNotes® are the copyrighted property of A D A M , Inc  or Joseph Schofield  Esta información es sólo para uso en educación  Berry intención no es darle un consejo médico sobre enfermedades o tratamientos  Colsulte con berry Rickford Benson farmacéutico antes de seguir cualquier régimen médico para saber si es seguro y efectivo para usted

## 2019-11-04 NOTE — PROGRESS NOTES
Assessment/Plan:    Controlled type 2 diabetes mellitus with complication, without long-term current use of insulin (HCC)    Lab Results   Component Value Date    HGBA1C 7 3 (A) 11/04/2019   Improved aic  PT regimen glyburide 5 mg daily  BP acceptable  On statin  On ace  LDL acceptable  Foot done - nl   Kidney function: creatinine 1 14 - to 1 3 - GFR 60-70 - on the low side of his baseline - discuss avoidance of Nsaids ( aleve/ibuprofen/motrin) and push water 4 glasses a day  Pt verbalizes understanding  Hyperlipidemia  LDL controlled on statin  HDL < 40 - discussed essential fatty acids - like olive oil and fish oil; literature given in [place]  Cont to monitor  Diagnoses and all orders for this visit:    Controlled type 2 diabetes mellitus with complication, without long-term current use of insulin (HCC)  -     POCT hemoglobin A1c    Hyperlipidemia, unspecified hyperlipidemia type  -     atorvastatin (LIPITOR) 20 mg tablet; Take 1 tablet (20 mg total) by mouth daily    Controlled diabetes mellitus type 2 with complications, unspecified whether long term insulin use (HCC)  -     glyBURIDE (DIABETA) 5 mg tablet; Take 1 tab twice daily with meals  -     lisinopril (ZESTRIL) 10 mg tablet; Take 1 tablet (10 mg total) by mouth daily    Proteinuria, unspecified type  -     lisinopril (ZESTRIL) 10 mg tablet; Take 1 tablet (10 mg total) by mouth daily    Other orders  -     Cancel: influenza vaccine, [id number], quadrivalent, recombinant, PF, 0 5 mL, for patients 18 yr+ (FLUBLOK)          Subjective:      Patient ID: Sarah Mann is a 61 y o  male  HPI  Chronic dz follow up w/ lab review - 10/21/19 ( cmp/lipid) acceptable/stable  Med list verified  POC aic improved at 7 3 ( 7 8) on glyburide daily - BS logs 120's  Brought in pill bottles - complaint  Offers no complaints  Discussed retinal eye exam - no recent eye exam   Had flu vaccine through work last month      The following portions of the patient's history were reviewed and updated as appropriate: allergies, past social history, past surgical history and problem list     Review of Systems   Constitutional: Negative for activity change and appetite change  HENT: Negative  Eyes: Negative  Respiratory: Negative  Cardiovascular: Negative  Negative for chest pain  Gastrointestinal: Negative  Endocrine: Negative  Negative for cold intolerance  Genitourinary: Negative  Musculoskeletal: Negative  Skin: Negative  Allergic/Immunologic: Negative  Neurological: Negative  Hematological: Negative  Psychiatric/Behavioral: Negative  All other systems reviewed and are negative  Objective:    Patient's shoes and socks removed  Right Foot/Ankle   Right Foot Inspection  Skin Exam: skin normal and skin intact no dry skin, no warmth, no callus, no erythema, no maceration, no abnormal color, no pre-ulcer, no ulcer and no callus                          Toe Exam: ROM and strength within normal limits  Sensory     Proprioception: intact   Monofilament testing: intact  Vascular  Capillary refills: < 3 seconds  The right DP pulse is 2+  The right PT pulse is 2+  Left Foot/Ankle  Left Foot Inspection  Skin Exam: skin normal and skin intactno dry skin, no warmth, no erythema, no maceration, normal color, no pre-ulcer, no ulcer and no callus                         Toe Exam: ROM and strength within normal limits                   Sensory     Proprioception: intact  Monofilament: intact  Vascular  Capillary refills: < 3 seconds  The left DP pulse is 2+  The left PT pulse is 2+  Assign Risk Category:  No deformity present; No loss of protective sensation;  No weak pulses       Risk: 0    /90 (BP Location: Left arm, Patient Position: Sitting, Cuff Size: Adult)   Pulse 59   Temp 98 2 °F (36 8 °C) (Oral)   Resp 18   Ht 5' 5" (1 651 m)   Wt 83 9 kg (185 lb)   SpO2 99%   BMI 30 79 kg/m²          Physical Exam Constitutional: He is oriented to person, place, and time  He appears well-developed and well-nourished  No distress  HENT:   Head: Normocephalic  Right Ear: Tympanic membrane and external ear normal  No decreased hearing is noted  Left Ear: Tympanic membrane and external ear normal  No decreased hearing is noted  Mouth/Throat: Oropharynx is clear and moist    Eyes: Pupils are equal, round, and reactive to light  Conjunctivae are normal    Neck: Normal range of motion  Neck supple  No thyromegaly present  Cardiovascular: Normal rate, regular rhythm, normal heart sounds and intact distal pulses  Pulses are no weak pulses  No murmur heard  Pulses:       Dorsalis pedis pulses are 2+ on the right side, and 2+ on the left side  Posterior tibial pulses are 2+ on the right side, and 2+ on the left side  Pulmonary/Chest: Effort normal and breath sounds normal  No respiratory distress  Abdominal: Soft  Bowel sounds are normal    Musculoskeletal: Normal range of motion  Feet:   Right Foot:   Skin Integrity: Negative for ulcer, skin breakdown, erythema, warmth, callus or dry skin  Left Foot:   Skin Integrity: Negative for ulcer, skin breakdown, erythema, warmth, callus or dry skin  Lymphadenopathy:     He has no cervical adenopathy  Neurological: He is alert and oriented to person, place, and time  He has normal reflexes  No cranial nerve deficit  Coordination normal    Skin: Skin is warm and dry  Psychiatric: He has a normal mood and affect   His behavior is normal  Judgment and thought content normal

## 2019-11-04 NOTE — ASSESSMENT & PLAN NOTE
Lab Results   Component Value Date    HGBA1C 7 3 (A) 11/04/2019   Improved aic  PT regimen glyburide 5 mg daily  BP acceptable  On statin  On ace  LDL acceptable  Foot done - nl   Kidney function: creatinine 1 14 - to 1 3 - GFR 60-70 - on the low side of his baseline - discuss avoidance of Nsaids ( aleve/ibuprofen/motrin) and push water 4 glasses a day  Pt verbalizes understanding

## 2020-01-09 DIAGNOSIS — E11.8 CONTROLLED TYPE 2 DIABETES MELLITUS WITH COMPLICATION, WITHOUT LONG-TERM CURRENT USE OF INSULIN (HCC): ICD-10-CM

## 2020-01-13 ENCOUNTER — TELEPHONE (OUTPATIENT)
Dept: FAMILY MEDICINE CLINIC | Facility: CLINIC | Age: 60
End: 2020-01-13

## 2020-04-17 DIAGNOSIS — E11.8 CONTROLLED TYPE 2 DIABETES MELLITUS WITH COMPLICATION, WITHOUT LONG-TERM CURRENT USE OF INSULIN (HCC): Primary | ICD-10-CM

## 2020-04-21 ENCOUNTER — TELEMEDICINE (OUTPATIENT)
Dept: FAMILY MEDICINE CLINIC | Facility: CLINIC | Age: 60
End: 2020-04-21
Payer: COMMERCIAL

## 2020-04-21 DIAGNOSIS — J06.9 VIRAL URI WITH COUGH: Primary | ICD-10-CM

## 2020-04-21 LAB — HBA1C MFR BLD: 9.2 % OF TOTAL HGB

## 2020-04-21 PROCEDURE — 99214 OFFICE O/P EST MOD 30 MIN: CPT | Performed by: FAMILY MEDICINE

## 2020-04-21 RX ORDER — BENZONATATE 100 MG/1
100 CAPSULE ORAL 3 TIMES DAILY PRN
Qty: 30 CAPSULE | Refills: 0 | Status: SHIPPED | OUTPATIENT
Start: 2020-04-21 | End: 2020-09-21 | Stop reason: ALTCHOICE

## 2020-05-11 ENCOUNTER — OFFICE VISIT (OUTPATIENT)
Dept: FAMILY MEDICINE CLINIC | Facility: CLINIC | Age: 60
End: 2020-05-11
Payer: COMMERCIAL

## 2020-05-11 VITALS
BODY MASS INDEX: 29.99 KG/M2 | DIASTOLIC BLOOD PRESSURE: 80 MMHG | RESPIRATION RATE: 16 BRPM | WEIGHT: 180 LBS | OXYGEN SATURATION: 97 % | HEART RATE: 74 BPM | SYSTOLIC BLOOD PRESSURE: 130 MMHG | TEMPERATURE: 98.3 F | HEIGHT: 65 IN

## 2020-05-11 DIAGNOSIS — E11.8 CONTROLLED TYPE 2 DIABETES MELLITUS WITH COMPLICATION, WITHOUT LONG-TERM CURRENT USE OF INSULIN (HCC): Primary | ICD-10-CM

## 2020-05-11 DIAGNOSIS — N18.2 STAGE 2 CHRONIC KIDNEY DISEASE: ICD-10-CM

## 2020-05-11 DIAGNOSIS — Z11.59 NEED FOR HEPATITIS C SCREENING TEST: ICD-10-CM

## 2020-05-11 DIAGNOSIS — R80.9 MICROALBUMINURIA: ICD-10-CM

## 2020-05-11 DIAGNOSIS — Z00.00 ANNUAL PHYSICAL EXAM: ICD-10-CM

## 2020-05-11 DIAGNOSIS — E11.8 CONTROLLED DIABETES MELLITUS TYPE 2 WITH COMPLICATIONS, UNSPECIFIED WHETHER LONG TERM INSULIN USE (HCC): ICD-10-CM

## 2020-05-11 DIAGNOSIS — Z23 ENCOUNTER FOR IMMUNIZATION: ICD-10-CM

## 2020-05-11 PROBLEM — J06.9 VIRAL URI WITH COUGH: Status: RESOLVED | Noted: 2020-04-21 | Resolved: 2020-05-11

## 2020-05-11 PROCEDURE — 90715 TDAP VACCINE 7 YRS/> IM: CPT

## 2020-05-11 PROCEDURE — 90471 IMMUNIZATION ADMIN: CPT

## 2020-05-11 PROCEDURE — 90732 PPSV23 VACC 2 YRS+ SUBQ/IM: CPT

## 2020-05-11 PROCEDURE — 90472 IMMUNIZATION ADMIN EACH ADD: CPT

## 2020-05-11 PROCEDURE — 99396 PREV VISIT EST AGE 40-64: CPT | Performed by: FAMILY MEDICINE

## 2020-05-11 RX ORDER — GLYBURIDE 5 MG/1
10 TABLET ORAL 2 TIMES DAILY WITH MEALS
Qty: 120 TABLET | Refills: 2 | Status: SHIPPED | OUTPATIENT
Start: 2020-05-11 | End: 2020-05-13

## 2020-05-13 DIAGNOSIS — R80.9 PROTEINURIA, UNSPECIFIED TYPE: ICD-10-CM

## 2020-05-13 DIAGNOSIS — E11.8 CONTROLLED TYPE 2 DIABETES MELLITUS WITH COMPLICATION, WITHOUT LONG-TERM CURRENT USE OF INSULIN (HCC): ICD-10-CM

## 2020-05-13 DIAGNOSIS — E78.5 HYPERLIPIDEMIA, UNSPECIFIED HYPERLIPIDEMIA TYPE: ICD-10-CM

## 2020-05-13 DIAGNOSIS — E11.8 CONTROLLED DIABETES MELLITUS TYPE 2 WITH COMPLICATIONS, UNSPECIFIED WHETHER LONG TERM INSULIN USE (HCC): ICD-10-CM

## 2020-05-13 RX ORDER — LISINOPRIL 10 MG/1
TABLET ORAL
Qty: 90 TABLET | Refills: 1 | Status: SHIPPED | OUTPATIENT
Start: 2020-05-13 | End: 2020-12-17

## 2020-05-13 RX ORDER — GLYBURIDE 5 MG/1
10 TABLET ORAL 2 TIMES DAILY WITH MEALS
Qty: 120 TABLET | Refills: 0 | Status: SHIPPED | OUTPATIENT
Start: 2020-05-13 | End: 2020-09-21

## 2020-05-13 RX ORDER — ATORVASTATIN CALCIUM 20 MG/1
TABLET, FILM COATED ORAL
Qty: 90 TABLET | Refills: 1 | Status: SHIPPED | OUTPATIENT
Start: 2020-05-13 | End: 2020-12-17

## 2020-07-21 LAB
ALBUMIN SERPL-MCNC: 4.4 G/DL (ref 3.6–5.1)
ALBUMIN/GLOB SERPL: 1.4 (CALC) (ref 1–2.5)
ALP SERPL-CCNC: 72 U/L (ref 35–144)
ALT SERPL-CCNC: 27 U/L (ref 9–46)
AST SERPL-CCNC: 21 U/L (ref 10–35)
BILIRUB SERPL-MCNC: 0.8 MG/DL (ref 0.2–1.2)
BUN SERPL-MCNC: 21 MG/DL (ref 7–25)
BUN/CREAT SERPL: 16 (CALC) (ref 6–22)
CALCIUM SERPL-MCNC: 9.9 MG/DL (ref 8.6–10.3)
CHLORIDE SERPL-SCNC: 107 MMOL/L (ref 98–110)
CHOLEST SERPL-MCNC: 122 MG/DL
CHOLEST/HDLC SERPL: 2.7 (CALC)
CO2 SERPL-SCNC: 29 MMOL/L (ref 20–32)
CREAT SERPL-MCNC: 1.33 MG/DL (ref 0.7–1.25)
GLOBULIN SER CALC-MCNC: 3.1 G/DL (CALC) (ref 1.9–3.7)
GLUCOSE SERPL-MCNC: 118 MG/DL (ref 65–99)
HBA1C MFR BLD: 6.6 % OF TOTAL HGB
HCV AB S/CO SERPL IA: 0.09
HCV AB SERPL QL IA: NORMAL
HDLC SERPL-MCNC: 45 MG/DL
LDLC SERPL CALC-MCNC: 56 MG/DL (CALC)
NONHDLC SERPL-MCNC: 77 MG/DL (CALC)
POTASSIUM SERPL-SCNC: 4.4 MMOL/L (ref 3.5–5.3)
PROT SERPL-MCNC: 7.5 G/DL (ref 6.1–8.1)
SL AMB EGFR AFRICAN AMERICAN: 67 ML/MIN/1.73M2
SL AMB EGFR NON AFRICAN AMERICAN: 58 ML/MIN/1.73M2
SODIUM SERPL-SCNC: 143 MMOL/L (ref 135–146)
TRIGL SERPL-MCNC: 125 MG/DL

## 2020-08-15 DIAGNOSIS — E11.8 CONTROLLED TYPE 2 DIABETES MELLITUS WITH COMPLICATION, WITHOUT LONG-TERM CURRENT USE OF INSULIN (HCC): ICD-10-CM

## 2020-08-17 RX ORDER — EMPAGLIFLOZIN 10 MG/1
TABLET, FILM COATED ORAL
Qty: 30 TABLET | Refills: 2 | Status: SHIPPED | OUTPATIENT
Start: 2020-08-17 | End: 2020-11-09

## 2020-09-15 LAB
ALBUMIN SERPL-MCNC: 4.5 G/DL (ref 3.6–5.1)
ALBUMIN/GLOB SERPL: 1.7 (CALC) (ref 1–2.5)
ALP SERPL-CCNC: 69 U/L (ref 35–144)
ALT SERPL-CCNC: 32 U/L (ref 9–46)
AST SERPL-CCNC: 23 U/L (ref 10–35)
BILIRUB SERPL-MCNC: 0.8 MG/DL (ref 0.2–1.2)
BUN SERPL-MCNC: 16 MG/DL (ref 7–25)
BUN/CREAT SERPL: 12 (CALC) (ref 6–22)
CALCIUM SERPL-MCNC: 9.7 MG/DL (ref 8.6–10.3)
CHLORIDE SERPL-SCNC: 107 MMOL/L (ref 98–110)
CHOLEST SERPL-MCNC: 133 MG/DL
CHOLEST/HDLC SERPL: 3.3 (CALC)
CO2 SERPL-SCNC: 28 MMOL/L (ref 20–32)
CREAT SERPL-MCNC: 1.38 MG/DL (ref 0.7–1.25)
GLOBULIN SER CALC-MCNC: 2.6 G/DL (CALC) (ref 1.9–3.7)
GLUCOSE SERPL-MCNC: 111 MG/DL (ref 65–99)
HBA1C MFR BLD: 6.7 % OF TOTAL HGB
HCV AB S/CO SERPL IA: 0.15
HCV AB SERPL QL IA: NORMAL
HDLC SERPL-MCNC: 40 MG/DL
LDLC SERPL CALC-MCNC: 72 MG/DL (CALC)
NONHDLC SERPL-MCNC: 93 MG/DL (CALC)
POTASSIUM SERPL-SCNC: 4.4 MMOL/L (ref 3.5–5.3)
PROT SERPL-MCNC: 7.1 G/DL (ref 6.1–8.1)
SL AMB EGFR AFRICAN AMERICAN: 64 ML/MIN/1.73M2
SL AMB EGFR NON AFRICAN AMERICAN: 55 ML/MIN/1.73M2
SODIUM SERPL-SCNC: 143 MMOL/L (ref 135–146)
TRIGL SERPL-MCNC: 127 MG/DL

## 2020-09-21 ENCOUNTER — OFFICE VISIT (OUTPATIENT)
Dept: FAMILY MEDICINE CLINIC | Facility: CLINIC | Age: 60
End: 2020-09-21
Payer: COMMERCIAL

## 2020-09-21 VITALS
DIASTOLIC BLOOD PRESSURE: 80 MMHG | WEIGHT: 181 LBS | HEIGHT: 65 IN | OXYGEN SATURATION: 99 % | HEART RATE: 65 BPM | SYSTOLIC BLOOD PRESSURE: 128 MMHG | BODY MASS INDEX: 30.16 KG/M2 | TEMPERATURE: 98.2 F

## 2020-09-21 DIAGNOSIS — E78.5 HYPERLIPIDEMIA, UNSPECIFIED HYPERLIPIDEMIA TYPE: ICD-10-CM

## 2020-09-21 DIAGNOSIS — E11.8 CONTROLLED TYPE 2 DIABETES MELLITUS WITH COMPLICATION, WITHOUT LONG-TERM CURRENT USE OF INSULIN (HCC): Primary | ICD-10-CM

## 2020-09-21 PROCEDURE — 99214 OFFICE O/P EST MOD 30 MIN: CPT | Performed by: FAMILY MEDICINE

## 2020-09-21 PROCEDURE — 82043 UR ALBUMIN QUANTITATIVE: CPT | Performed by: FAMILY MEDICINE

## 2020-09-21 PROCEDURE — 82570 ASSAY OF URINE CREATININE: CPT | Performed by: FAMILY MEDICINE

## 2020-09-21 RX ORDER — GLYBURIDE 5 MG/1
5 TABLET ORAL 2 TIMES DAILY WITH MEALS
Qty: 60 TABLET | Refills: 5 | Status: SHIPPED | OUTPATIENT
Start: 2020-09-21 | End: 2021-03-29 | Stop reason: SDUPTHER

## 2020-09-21 NOTE — PROGRESS NOTES
Assessment/Plan:    Controlled type 2 diabetes mellitus with complication, without long-term current use of insulin (HCC)    Lab Results   Component Value Date    HGBA1C 6 7 (H) 09/14/2020   Well controlled  Improved from 9 2  On glyburide 5mg BID, metformin 500mg BID, jardiance 10mg daily  ON ace and lipitor 20mg daily  Hyperlipidemia  Well controlled on lipitor  Diagnoses and all orders for this visit:    Controlled type 2 diabetes mellitus with complication, without long-term current use of insulin (HCC)  -     Microalbumin / creatinine urine ratio  -     glyBURIDE (DIABETA) 5 mg tablet; Take 1 tablet (5 mg total) by mouth 2 (two) times a day with meals  -     Basic metabolic panel; Future  -     Hemoglobin A1C; Future    Hyperlipidemia, unspecified hyperlipidemia type  -     Lipid panel; Future          Subjective: chronic conditions check up     Patient ID: Alex Gallegos is a 61 y o  male  HPI  Labs reviewed  Chem profile sodium 143, potassium 4 4, BUN 16, creatinine 1 38, AST 23, ALT 32, GFR 55  Lipid profile cholesterol 133, triglycerides 127, HDL 40, LDL 72  Hemoglobin A1c 6 7, hepatitis-C nonreactive  The following portions of the patient's history were reviewed and updated as appropriate:   He   Patient Active Problem List    Diagnosis Date Noted    Hyperlipidemia 11/05/2018    Controlled type 2 diabetes mellitus with complication, without long-term current use of insulin ([facility] [address] ) 11/05/2018    Need for influenza vaccination 11/05/2018    Encounter for screening colonoscopy 01/31/2018     He  has a past surgical history that includes No past surgeries and EGD AND COLONOSCOPY (N/A, 3/19/2018)  His family history includes Cancer in his father  He  reports that he has never smoked  He has never used smokeless tobacco  He reports that he does not drink alcohol or use drugs    Current Outpatient Medications   Medication Sig Dispense Refill    atorvastatin (LIPITOR) 20 mg tablet TAKE 1 TABLET(20 MG) BY MOUTH DAILY 90 tablet 1    glucose blood (TRUE METRIX BLOOD GLUCOSE TEST) test strip Check twice daily 100 each 5    Jardiance 10 MG TABS TAKE 1 TABLET(10 MG) BY MOUTH EVERY MORNING 30 tablet 2    lisinopril (ZESTRIL) 10 mg tablet TAKE 1 TABLET(10 MG) BY MOUTH DAILY 90 tablet 1    metFORMIN (GLUCOPHAGE) 500 mg tablet Take 1 tablet (500 mg total) by mouth 2 (two) times a day with meals 60 tablet 5    glyBURIDE (DIABETA) 5 mg tablet Take 1 tablet (5 mg total) by mouth 2 (two) times a day with meals 60 tablet 5     No current facility-administered medications for this visit       Review of Systems   Constitutional: Negative for activity change, appetite change, fever and unexpected weight change  HENT: Negative for ear pain, postnasal drip and rhinorrhea  Eyes: Negative for photophobia and pain  Respiratory: Negative for cough, shortness of breath and wheezing  Cardiovascular: Negative for chest pain, palpitations and leg swelling  Gastrointestinal: Negative for abdominal pain, blood in stool, nausea and vomiting  Endocrine: Negative for polydipsia and polyuria  Genitourinary: Negative for difficulty urinating, hematuria and urgency  Musculoskeletal: Negative for myalgias  Skin: Negative for rash  Neurological: Negative for dizziness  Psychiatric/Behavioral: Negative for confusion and sleep disturbance  PHQ-9 Depression Screening    PHQ-9:    Frequency of the following problems over the past two weeks:                Objective:      /80 (BP Location: Left arm, Patient Position: Sitting, Cuff Size: Standard)   Pulse 65   Temp 98 2 °F (36 8 °C) (Tympanic)   Ht 5' 5" (1 651 m)   Wt 82 1 kg (181 lb)   SpO2 99%   BMI 30 12 kg/m²          Physical Exam  Constitutional:       Appearance: He is well-developed  HENT:      Head: Normocephalic and atraumatic        Right Ear: External ear normal       Left Ear: External ear normal  Mouth/Throat:      Pharynx: No oropharyngeal exudate  Eyes:      Conjunctiva/sclera: Conjunctivae normal       Pupils: Pupils are equal, round, and reactive to light  Neck:      Musculoskeletal: Normal range of motion and neck supple  Cardiovascular:      Rate and Rhythm: Normal rate and regular rhythm  Pulses: no weak pulses          Dorsalis pedis pulses are 2+ on the right side and 2+ on the left side  Posterior tibial pulses are 2+ on the right side and 2+ on the left side  Heart sounds: Normal heart sounds  No murmur  No friction rub  No gallop  Pulmonary:      Effort: Pulmonary effort is normal  No respiratory distress  Breath sounds: Normal breath sounds  No wheezing or rales  Chest:      Chest wall: No tenderness  Abdominal:      General: Bowel sounds are normal  There is no distension  Palpations: Abdomen is soft  There is no mass  Tenderness: There is no abdominal tenderness  There is no rebound  Musculoskeletal: Normal range of motion  Feet:      Right foot:      Skin integrity: No ulcer, skin breakdown, erythema, warmth, callus or dry skin  Left foot:      Skin integrity: No ulcer, skin breakdown, erythema, warmth, callus or dry skin  Skin:     General: Skin is warm and dry  Neurological:      Mental Status: He is alert and oriented to person, place, and time  Patient's shoes and socks removed  Right Foot/Ankle   Right Foot Inspection  Skin Exam: skin normal and skin intact no dry skin, no warmth, no callus, no erythema, no maceration, no abnormal color, no pre-ulcer, no ulcer and no callus                          Toe Exam: ROM and strength within normal limits  Sensory   Vibration: intact  Proprioception: intact   Monofilament testing: intact  Vascular  Capillary refills: < 3 seconds  The right DP pulse is 2+  The right PT pulse is 2+       Left Foot/Ankle  Left Foot Inspection  Skin Exam: skin normal and skin intactno dry skin, no warmth, no erythema, no maceration, normal color, no pre-ulcer, no ulcer and no callus                         Toe Exam: ROM and strength within normal limits                   Sensory   Vibration: intact  Proprioception: intact  Monofilament: intact  Vascular  Capillary refills: < 3 seconds  The left DP pulse is 2+  The left PT pulse is 2+  Assign Risk Category:  No deformity present; No loss of protective sensation;  No weak pulses       Risk: 0

## 2020-09-21 NOTE — ASSESSMENT & PLAN NOTE
Lab Results   Component Value Date    HGBA1C 6 7 (H) 09/14/2020   Well controlled  Improved from 9 2  On glyburide 5mg BID, metformin 500mg BID, jardiance 10mg daily  ON ace and lipitor 20mg daily

## 2020-09-22 LAB
CREAT UR-MCNC: 84.7 MG/DL
MICROALBUMIN UR-MCNC: 11.8 MG/L (ref 0–20)
MICROALBUMIN/CREAT 24H UR: 14 MG/G CREATININE (ref 0–30)

## 2020-10-22 LAB
LEFT EYE DIABETIC RETINOPATHY: NORMAL
RIGHT EYE DIABETIC RETINOPATHY: NORMAL

## 2020-11-09 DIAGNOSIS — E11.8 CONTROLLED TYPE 2 DIABETES MELLITUS WITH COMPLICATION, WITHOUT LONG-TERM CURRENT USE OF INSULIN (HCC): ICD-10-CM

## 2020-11-09 RX ORDER — EMPAGLIFLOZIN 10 MG/1
TABLET, FILM COATED ORAL
Qty: 30 TABLET | Refills: 2 | Status: SHIPPED | OUTPATIENT
Start: 2020-11-09 | End: 2021-02-13

## 2020-12-17 DIAGNOSIS — E11.8 CONTROLLED DIABETES MELLITUS TYPE 2 WITH COMPLICATIONS, UNSPECIFIED WHETHER LONG TERM INSULIN USE (HCC): ICD-10-CM

## 2020-12-17 DIAGNOSIS — E78.5 HYPERLIPIDEMIA, UNSPECIFIED HYPERLIPIDEMIA TYPE: ICD-10-CM

## 2020-12-17 DIAGNOSIS — R80.9 PROTEINURIA, UNSPECIFIED TYPE: ICD-10-CM

## 2020-12-17 RX ORDER — LISINOPRIL 10 MG/1
TABLET ORAL
Qty: 90 TABLET | Refills: 1 | Status: SHIPPED | OUTPATIENT
Start: 2020-12-17 | End: 2021-03-29 | Stop reason: SDUPTHER

## 2020-12-17 RX ORDER — ATORVASTATIN CALCIUM 20 MG/1
TABLET, FILM COATED ORAL
Qty: 90 TABLET | Refills: 1 | Status: SHIPPED | OUTPATIENT
Start: 2020-12-17 | End: 2021-03-29 | Stop reason: SDUPTHER

## 2021-02-13 DIAGNOSIS — E11.8 CONTROLLED TYPE 2 DIABETES MELLITUS WITH COMPLICATION, WITHOUT LONG-TERM CURRENT USE OF INSULIN (HCC): ICD-10-CM

## 2021-02-13 RX ORDER — EMPAGLIFLOZIN 10 MG/1
TABLET, FILM COATED ORAL
Qty: 30 TABLET | Refills: 2 | Status: SHIPPED | OUTPATIENT
Start: 2021-02-13 | End: 2021-05-24

## 2021-03-16 LAB
BUN SERPL-MCNC: 22 MG/DL (ref 7–25)
BUN/CREAT SERPL: 15 (CALC) (ref 6–22)
CALCIUM SERPL-MCNC: 10 MG/DL (ref 8.6–10.3)
CHLORIDE SERPL-SCNC: 104 MMOL/L (ref 98–110)
CHOLEST SERPL-MCNC: 141 MG/DL
CHOLEST/HDLC SERPL: 3.4 (CALC)
CO2 SERPL-SCNC: 26 MMOL/L (ref 20–32)
CREAT SERPL-MCNC: 1.42 MG/DL (ref 0.7–1.25)
GLUCOSE SERPL-MCNC: 127 MG/DL (ref 65–99)
HBA1C MFR BLD: 6.8 % OF TOTAL HGB
HDLC SERPL-MCNC: 42 MG/DL
LDLC SERPL CALC-MCNC: 75 MG/DL (CALC)
NONHDLC SERPL-MCNC: 99 MG/DL (CALC)
POTASSIUM SERPL-SCNC: 4.4 MMOL/L (ref 3.5–5.3)
SL AMB EGFR AFRICAN AMERICAN: 61 ML/MIN/1.73M2
SL AMB EGFR NON AFRICAN AMERICAN: 53 ML/MIN/1.73M2
SODIUM SERPL-SCNC: 140 MMOL/L (ref 135–146)
TRIGL SERPL-MCNC: 161 MG/DL

## 2021-03-29 ENCOUNTER — OFFICE VISIT (OUTPATIENT)
Dept: FAMILY MEDICINE CLINIC | Facility: CLINIC | Age: 61
End: 2021-03-29
Payer: COMMERCIAL

## 2021-03-29 VITALS
WEIGHT: 183 LBS | HEART RATE: 78 BPM | TEMPERATURE: 98.9 F | RESPIRATION RATE: 18 BRPM | BODY MASS INDEX: 30.49 KG/M2 | SYSTOLIC BLOOD PRESSURE: 130 MMHG | HEIGHT: 65 IN | DIASTOLIC BLOOD PRESSURE: 80 MMHG | OXYGEN SATURATION: 98 %

## 2021-03-29 DIAGNOSIS — R80.9 PROTEINURIA, UNSPECIFIED TYPE: ICD-10-CM

## 2021-03-29 DIAGNOSIS — E11.8 CONTROLLED TYPE 2 DIABETES MELLITUS WITH COMPLICATION, WITHOUT LONG-TERM CURRENT USE OF INSULIN (HCC): ICD-10-CM

## 2021-03-29 DIAGNOSIS — E78.5 HYPERLIPIDEMIA, UNSPECIFIED HYPERLIPIDEMIA TYPE: ICD-10-CM

## 2021-03-29 DIAGNOSIS — E11.8 CONTROLLED DIABETES MELLITUS TYPE 2 WITH COMPLICATIONS, UNSPECIFIED WHETHER LONG TERM INSULIN USE (HCC): ICD-10-CM

## 2021-03-29 DIAGNOSIS — N18.31 STAGE 3A CHRONIC KIDNEY DISEASE (HCC): Primary | ICD-10-CM

## 2021-03-29 LAB
CREAT UR-MCNC: 66.2 MG/DL
MICROALBUMIN UR-MCNC: 5.8 MG/L (ref 0–20)
MICROALBUMIN/CREAT 24H UR: 9 MG/G CREATININE (ref 0–30)
SL AMB  POCT GLUCOSE, UA: ABNORMAL
SL AMB LEUKOCYTE ESTERASE,UA: ABNORMAL
SL AMB POCT BILIRUBIN,UA: ABNORMAL
SL AMB POCT BLOOD,UA: ABNORMAL
SL AMB POCT CLARITY,UA: CLEAR
SL AMB POCT COLOR,UA: YELLOW
SL AMB POCT KETONES,UA: ABNORMAL
SL AMB POCT NITRITE,UA: ABNORMAL
SL AMB POCT PH,UA: 6
SL AMB POCT SPECIFIC GRAVITY,UA: 1.01
SL AMB POCT URINE PROTEIN: ABNORMAL
SL AMB POCT UROBILINOGEN: 0.2

## 2021-03-29 PROCEDURE — 82043 UR ALBUMIN QUANTITATIVE: CPT | Performed by: FAMILY MEDICINE

## 2021-03-29 PROCEDURE — 99214 OFFICE O/P EST MOD 30 MIN: CPT | Performed by: FAMILY MEDICINE

## 2021-03-29 PROCEDURE — 82570 ASSAY OF URINE CREATININE: CPT | Performed by: FAMILY MEDICINE

## 2021-03-29 PROCEDURE — 81002 URINALYSIS NONAUTO W/O SCOPE: CPT | Performed by: FAMILY MEDICINE

## 2021-03-29 RX ORDER — LISINOPRIL 10 MG/1
10 TABLET ORAL DAILY
Qty: 90 TABLET | Refills: 1 | Status: SHIPPED | OUTPATIENT
Start: 2021-03-29 | End: 2021-06-21

## 2021-03-29 RX ORDER — GLYBURIDE 5 MG/1
5 TABLET ORAL 2 TIMES DAILY WITH MEALS
Qty: 180 TABLET | Refills: 1 | Status: SHIPPED | OUTPATIENT
Start: 2021-03-29 | End: 2021-10-04 | Stop reason: SDUPTHER

## 2021-03-29 RX ORDER — ATORVASTATIN CALCIUM 20 MG/1
20 TABLET, FILM COATED ORAL DAILY
Qty: 90 TABLET | Refills: 1 | Status: SHIPPED | OUTPATIENT
Start: 2021-03-29 | End: 2021-06-21

## 2021-03-29 NOTE — ASSESSMENT & PLAN NOTE
Lab Results   Component Value Date    HGBA1C 6 8 (H) 03/15/2021     Well controlled at 6 8  Continue glyburide 5 mg twice daily, metformin 500 mg twice daily, Jardiance 10 mg daily  On Ace and Lipitor 20 mg daily  Eye exam and foot exam up-to-date

## 2021-03-29 NOTE — ASSESSMENT & PLAN NOTE
Lab Results   Component Value Date    EGFR 73 12/18/2017    CREATININE 1 42 (H) 03/15/2021    CREATININE 1 38 (H) 09/14/2020    CREATININE 1 33 (H) 07/20/2020   [place] urine dip  No blood  Check kidney bladder ultrasound  Recheck bmp in 3months

## 2021-03-29 NOTE — PROGRESS NOTES
Assessment/Plan:    Hyperlipidemia  Well controlled on lipitor  Controlled type 2 diabetes mellitus with complication, without long-term current use of insulin ([facility])    Lab Results   Component Value Date    HGBA1C 6 8 (H) 03/15/2021     Well controlled at 6 8  Continue glyburide 5 mg twice daily, metformin 500 mg twice daily, Jardiance 10 mg daily  On Ace and Lipitor 20 mg daily  Eye exam and foot exam up-to-date  Stage 3a chronic kidney disease  Lab Results   Component Value Date    EGFR 73 12/18/2017    CREATININE 1 42 (H) 03/15/2021    CREATININE 1 38 (H) 09/14/2020    CREATININE 1 33 (H) 07/20/2020   [place] urine dip  No blood  Check kidney bladder ultrasound  Recheck bmp in 3months  Diagnoses and all orders for this visit:    Stage 3a chronic kidney disease  -     Microalbumin / creatinine urine ratio  -     Basic metabolic panel; Future  -     POCT urine dip  -     Basic metabolic panel; Future  -      kidney and bladder; Future    Controlled type 2 diabetes mellitus with complication, without long-term current use of insulin (HCC)  -     Hemoglobin A1C; Future  -     metFORMIN (GLUCOPHAGE) 500 mg tablet; Take 1 tablet (500 mg total) by mouth daily with breakfast  -     glyBURIDE (DIABETA) 5 mg tablet; Take 1 tablet (5 mg total) by mouth 2 (two) times a day with meals    Hyperlipidemia, unspecified hyperlipidemia type  -     Lipid panel; Future  -     atorvastatin (LIPITOR) 20 mg tablet; Take 1 tablet (20 mg total) by mouth daily    Controlled diabetes mellitus type 2 with complications, unspecified whether long term insulin use (HCC)  -     lisinopril (ZESTRIL) 10 mg tablet; Take 1 tablet (10 mg total) by mouth daily    Proteinuria, unspecified type  -     lisinopril (ZESTRIL) 10 mg tablet;  Take 1 tablet (10 mg total) by mouth daily          Subjective: Conditions checkup     Patient ID: Helio Cueto is a 64 y o  male with history of diabetes mellitus type 2, hyperlipidemia    HPI   labs reviewed with patient  Chem profile sodium 140, potassium 4 4, BUN 22, creatinine 1 42, random glucose 127, calcium 10, GFR 53, total cholesterol 141, triglycerides 161, HDL 42, LDL 75, hemoglobin A1c 6 8  The patient was started on lisinopril over year ago due to microscopic proteinuria  He does not have history of hypertension  Patient reports left-sided kidney pain of the last 2 months which is on often exacerbated by standing for long periods at work  He denies any history of kidney stones, dysuria, hematuria, frequency or hesitation  The following portions of the patient's history were reviewed and updated as appropriate:   He   Patient Active Problem List    Diagnosis Date Noted    Stage 3a chronic kidney disease 03/29/2021    Hyperlipidemia 11/05/2018    Controlled type 2 diabetes mellitus with complication, without long-term current use of insulin ([facility] [address] ) 11/05/2018    Need for influenza vaccination 11/05/2018    Encounter for screening colonoscopy 01/31/2018     He  has a past surgical history that includes No past surgeries and EGD AND COLONOSCOPY (N/A, 3/19/2018)  His family history includes Cancer in his father  He  reports that he has never smoked  He has never used smokeless tobacco  He reports that he does not drink alcohol or use drugs    Current Outpatient Medications   Medication Sig Dispense Refill    atorvastatin (LIPITOR) 20 mg tablet Take 1 tablet (20 mg total) by mouth daily 90 tablet 1    glucose blood (TRUE METRIX BLOOD GLUCOSE TEST) test strip Check twice daily 100 each 5    Jardiance 10 MG TABS TAKE 1 TABLET(10 MG) BY MOUTH EVERY MORNING 30 tablet 2    lisinopril (ZESTRIL) 10 mg tablet Take 1 tablet (10 mg total) by mouth daily 90 tablet 1    metFORMIN (GLUCOPHAGE) 500 mg tablet Take 1 tablet (500 mg total) by mouth daily with breakfast 90 tablet 1    glyBURIDE (DIABETA) 5 mg tablet Take 1 tablet (5 mg total) by mouth 2 (two) times a day with meals 180 tablet 1     No current facility-administered medications for this visit       Review of Systems   Constitutional: Negative for activity change, appetite change, fever and unexpected weight change  HENT: Negative for ear pain, postnasal drip and rhinorrhea  Eyes: Negative for photophobia and pain  Respiratory: Negative for cough, shortness of breath and wheezing  Cardiovascular: Negative for chest pain, palpitations and leg swelling  Gastrointestinal: Negative for abdominal pain, blood in stool, nausea and vomiting  Endocrine: Negative for polydipsia and polyuria  Genitourinary: Negative for difficulty urinating, hematuria and urgency  Musculoskeletal: Negative for myalgias  Skin: Negative for rash  Neurological: Negative for dizziness  Psychiatric/Behavioral: Negative for confusion and sleep disturbance  PHQ-9 Depression Screening    PHQ-9:   Frequency of the following problems over the past two weeks:      Little interest or pleasure in doing things: 0 - not at all  Feeling down, depressed, or hopeless: 0 - not at all  PHQ-2 Score: 0           Objective:      /80 (BP Location: Left arm, Patient Position: Sitting, Cuff Size: Adult)   Pulse 78   Temp 98 9 °F (37 2 °C) (Tympanic)   Resp 18   Ht 5' 5" (1 651 m)   Wt 83 kg (183 lb)   SpO2 98%   BMI 30 45 kg/m²          Physical Exam  Constitutional:       Appearance: He is well-developed  HENT:      Head: Normocephalic and atraumatic  Right Ear: External ear normal       Left Ear: External ear normal       Mouth/Throat:      Pharynx: No oropharyngeal exudate  Eyes:      Conjunctiva/sclera: Conjunctivae normal       Pupils: Pupils are equal, round, and reactive to light  Neck:      Musculoskeletal: Normal range of motion and neck supple  Cardiovascular:      Rate and Rhythm: Normal rate and regular rhythm  Heart sounds: Normal heart sounds  No murmur  No friction rub  No gallop  Pulmonary:      Effort: Pulmonary effort is normal  No respiratory distress  Breath sounds: Normal breath sounds  No wheezing or rales  Chest:      Chest wall: No tenderness  Abdominal:      General: Bowel sounds are normal  There is no distension  Palpations: Abdomen is soft  There is no mass  Tenderness: There is no abdominal tenderness  There is no rebound  Musculoskeletal: Normal range of motion  Comments: Left-sided back pain 2  nontender to palpation  Skin:     General: Skin is warm and dry  Neurological:      Mental Status: He is alert and oriented to person, place, and time           Recent Results (from the past 1008 hour(s))   Lipid panel    Collection Time: 03/15/21  7:39 AM   Result Value Ref Range    Total Cholesterol 141 <200 mg/dL    HDL 42 > OR = 40 mg/dL    Triglycerides 161 (H) <150 mg/dL    LDL Calculated 75 mg/dL (calc)    Chol HDLC Ratio 3 4 <5 0 (calc)    Non-HDL Cholesterol 99 <130 mg/dL (calc)   Basic metabolic panel    Collection Time: 03/15/21  7:39 AM   Result Value Ref Range    Glucose, Random 127 (H) 65 - 99 mg/dL    BUN 22 7 - 25 mg/dL    Creatinine 1 42 (H) 0 70 - 1 25 mg/dL    eGFR Non  53 (L) > OR = 60 mL/min/1 73m2    eGFR  61 > OR = 60 mL/min/1 73m2    SL AMB BUN/CREATININE RATIO 15 6 - 22 (calc)    Sodium 140 135 - 146 mmol/L    Potassium 4 4 3 5 - 5 3 mmol/L    Chloride 104 98 - 110 mmol/L    CO2 26 20 - 32 mmol/L    Calcium 10 0 8 6 - 10 3 mg/dL   Hemoglobin A1c (w/out EAG)    Collection Time: 03/15/21  7:39 AM   Result Value Ref Range    Hemoglobin A1C 6 8 (H) <5 7 % of total Hgb   POCT urine dip    Collection Time: 03/29/21  8:56 AM   Result Value Ref Range    LEUKOCYTE ESTERASE,UA neg     NITRITE,UA neg     SL AMB POCT UROBILINOGEN 0 2     POCT URINE PROTEIN neg      PH,UA 6 0     BLOOD,UA neg     SPECIFIC GRAVITY,UA 1 015     KETONES,UA neg     BILIRUBIN,UA neg     GLUCOSE, UA 3+      COLOR,UA yellow CLARITY,UA clear

## 2021-05-17 NOTE — PROGRESS NOTES
BMI Counseling: Body mass index is 30 45 kg/m²  The BMI is above normal  Nutrition recommendations include reducing portion sizes, decreasing overall calorie intake, 3-5 servings of fruits/vegetables daily and reducing fast food intake  Exercise recommendations include exercising 3-5 times per week

## 2021-05-22 DIAGNOSIS — E11.8 CONTROLLED TYPE 2 DIABETES MELLITUS WITH COMPLICATION, WITHOUT LONG-TERM CURRENT USE OF INSULIN (HCC): ICD-10-CM

## 2021-05-24 RX ORDER — EMPAGLIFLOZIN 10 MG/1
TABLET, FILM COATED ORAL
Qty: 30 TABLET | Refills: 2 | Status: SHIPPED | OUTPATIENT
Start: 2021-05-24 | End: 2021-08-30

## 2021-06-19 DIAGNOSIS — R80.9 PROTEINURIA, UNSPECIFIED TYPE: ICD-10-CM

## 2021-06-19 DIAGNOSIS — E11.8 CONTROLLED DIABETES MELLITUS TYPE 2 WITH COMPLICATIONS, UNSPECIFIED WHETHER LONG TERM INSULIN USE (HCC): ICD-10-CM

## 2021-06-20 DIAGNOSIS — E78.5 HYPERLIPIDEMIA, UNSPECIFIED HYPERLIPIDEMIA TYPE: ICD-10-CM

## 2021-06-21 RX ORDER — ATORVASTATIN CALCIUM 20 MG/1
TABLET, FILM COATED ORAL
Qty: 90 TABLET | Refills: 1 | Status: SHIPPED | OUTPATIENT
Start: 2021-06-21 | End: 2022-01-10 | Stop reason: SDUPTHER

## 2021-06-21 RX ORDER — LISINOPRIL 10 MG/1
TABLET ORAL
Qty: 90 TABLET | Refills: 1 | Status: SHIPPED | OUTPATIENT
Start: 2021-06-21 | End: 2022-01-10 | Stop reason: SDUPTHER

## 2021-06-29 LAB
BUN SERPL-MCNC: 22 MG/DL (ref 7–25)
BUN/CREAT SERPL: 17 (CALC) (ref 6–22)
CALCIUM SERPL-MCNC: 10.1 MG/DL (ref 8.6–10.3)
CHLORIDE SERPL-SCNC: 103 MMOL/L (ref 98–110)
CO2 SERPL-SCNC: 27 MMOL/L (ref 20–32)
CREAT SERPL-MCNC: 1.33 MG/DL (ref 0.7–1.25)
GLUCOSE SERPL-MCNC: 119 MG/DL (ref 65–99)
POTASSIUM SERPL-SCNC: 4.5 MMOL/L (ref 3.5–5.3)
SL AMB EGFR AFRICAN AMERICAN: 66 ML/MIN/1.73M2
SL AMB EGFR NON AFRICAN AMERICAN: 57 ML/MIN/1.73M2
SODIUM SERPL-SCNC: 140 MMOL/L (ref 135–146)

## 2021-08-28 DIAGNOSIS — E11.8 CONTROLLED TYPE 2 DIABETES MELLITUS WITH COMPLICATION, WITHOUT LONG-TERM CURRENT USE OF INSULIN (HCC): ICD-10-CM

## 2021-08-30 RX ORDER — EMPAGLIFLOZIN 10 MG/1
TABLET, FILM COATED ORAL
Qty: 30 TABLET | Refills: 2 | Status: SHIPPED | OUTPATIENT
Start: 2021-08-30 | End: 2021-10-04 | Stop reason: SDUPTHER

## 2021-09-20 LAB
BUN SERPL-MCNC: 24 MG/DL (ref 7–25)
BUN/CREAT SERPL: ABNORMAL (CALC) (ref 6–22)
CALCIUM SERPL-MCNC: 10.4 MG/DL (ref 8.6–10.3)
CHLORIDE SERPL-SCNC: 104 MMOL/L (ref 98–110)
CHOLEST SERPL-MCNC: 138 MG/DL
CHOLEST/HDLC SERPL: 3 (CALC)
CO2 SERPL-SCNC: 28 MMOL/L (ref 20–32)
CREAT SERPL-MCNC: 1.22 MG/DL (ref 0.7–1.25)
GLUCOSE SERPL-MCNC: 154 MG/DL (ref 65–99)
HBA1C MFR BLD: 7.5 % OF TOTAL HGB
HDLC SERPL-MCNC: 46 MG/DL
LDLC SERPL CALC-MCNC: 71 MG/DL (CALC)
NONHDLC SERPL-MCNC: 92 MG/DL (CALC)
POTASSIUM SERPL-SCNC: 5.1 MMOL/L (ref 3.5–5.3)
SL AMB EGFR AFRICAN AMERICAN: 74 ML/MIN/1.73M2
SL AMB EGFR NON AFRICAN AMERICAN: 64 ML/MIN/1.73M2
SODIUM SERPL-SCNC: 142 MMOL/L (ref 135–146)
TRIGL SERPL-MCNC: 130 MG/DL

## 2021-09-23 NOTE — PROGRESS NOTES
Diabetic Foot Exam    Patient's shoes and socks removed  Right Foot/Ankle   Right Foot Inspection  Skin Exam: skin normal and skin intact no dry skin, no warmth, no callus, no erythema, no maceration, no abnormal color, no pre-ulcer, no ulcer and no callus                          Toe Exam: ROM and strength within normal limits  Sensory   Vibration: intact  Proprioception: intact   Monofilament testing: intact  Vascular  Capillary refills: elevated  The right DP pulse is 1+  The right PT pulse is 1+  Left Foot/Ankle  Left Foot Inspection  Skin Exam: skin normal and skin intactno dry skin, no warmth, no erythema, no maceration, normal color, no pre-ulcer, no ulcer and no callus                         Toe Exam: ROM and strength within normal limits                   Sensory   Vibration: intact  Proprioception: intact  Monofilament: intact  Vascular  Capillary refills: elevated  The left DP pulse is 1+  The left PT pulse is 1+  Assign Risk Category:  No deformity present; No loss of protective sensation;  No weak pulses       Risk: 0

## 2021-09-25 DIAGNOSIS — E11.8 CONTROLLED TYPE 2 DIABETES MELLITUS WITH COMPLICATION, WITHOUT LONG-TERM CURRENT USE OF INSULIN (HCC): ICD-10-CM

## 2021-10-04 ENCOUNTER — OFFICE VISIT (OUTPATIENT)
Dept: FAMILY MEDICINE CLINIC | Facility: CLINIC | Age: 61
End: 2021-10-04
Payer: COMMERCIAL

## 2021-10-04 VITALS
OXYGEN SATURATION: 98 % | SYSTOLIC BLOOD PRESSURE: 126 MMHG | WEIGHT: 183 LBS | DIASTOLIC BLOOD PRESSURE: 76 MMHG | RESPIRATION RATE: 17 BRPM | TEMPERATURE: 99 F | HEART RATE: 65 BPM | BODY MASS INDEX: 30.49 KG/M2 | HEIGHT: 65 IN

## 2021-10-04 DIAGNOSIS — E66.09 CLASS 1 OBESITY DUE TO EXCESS CALORIES WITH SERIOUS COMORBIDITY AND BODY MASS INDEX (BMI) OF 30.0 TO 30.9 IN ADULT: ICD-10-CM

## 2021-10-04 DIAGNOSIS — N18.31 STAGE 3A CHRONIC KIDNEY DISEASE (HCC): ICD-10-CM

## 2021-10-04 DIAGNOSIS — L30.9 ECZEMA, UNSPECIFIED TYPE: ICD-10-CM

## 2021-10-04 DIAGNOSIS — E11.8 CONTROLLED TYPE 2 DIABETES MELLITUS WITH COMPLICATION, WITHOUT LONG-TERM CURRENT USE OF INSULIN (HCC): ICD-10-CM

## 2021-10-04 DIAGNOSIS — E78.5 HYPERLIPIDEMIA, UNSPECIFIED HYPERLIPIDEMIA TYPE: ICD-10-CM

## 2021-10-04 DIAGNOSIS — Z12.5 SCREENING FOR MALIGNANT NEOPLASM OF PROSTATE: ICD-10-CM

## 2021-10-04 DIAGNOSIS — Z23 ENCOUNTER FOR VACCINATION: ICD-10-CM

## 2021-10-04 DIAGNOSIS — Z00.00 ANNUAL PHYSICAL EXAM: Primary | ICD-10-CM

## 2021-10-04 PROBLEM — E66.811 CLASS 1 OBESITY DUE TO EXCESS CALORIES WITH SERIOUS COMORBIDITY AND BODY MASS INDEX (BMI) OF 30.0 TO 30.9 IN ADULT: Status: ACTIVE | Noted: 2021-10-04

## 2021-10-04 PROCEDURE — 90471 IMMUNIZATION ADMIN: CPT | Performed by: FAMILY MEDICINE

## 2021-10-04 PROCEDURE — 99214 OFFICE O/P EST MOD 30 MIN: CPT | Performed by: FAMILY MEDICINE

## 2021-10-04 PROCEDURE — 90682 RIV4 VACC RECOMBINANT DNA IM: CPT | Performed by: FAMILY MEDICINE

## 2021-10-04 PROCEDURE — 99396 PREV VISIT EST AGE 40-64: CPT | Performed by: FAMILY MEDICINE

## 2021-10-04 PROCEDURE — 3008F BODY MASS INDEX DOCD: CPT | Performed by: FAMILY MEDICINE

## 2021-10-04 RX ORDER — GLYBURIDE 5 MG/1
5 TABLET ORAL 2 TIMES DAILY WITH MEALS
Qty: 180 TABLET | Refills: 1 | Status: SHIPPED | OUTPATIENT
Start: 2021-10-04 | End: 2022-01-10 | Stop reason: SDUPTHER

## 2021-10-04 RX ORDER — EMPAGLIFLOZIN 10 MG/1
10 TABLET, FILM COATED ORAL DAILY
Qty: 90 TABLET | Refills: 1 | Status: SHIPPED | OUTPATIENT
Start: 2021-10-04 | End: 2022-05-24 | Stop reason: SDUPTHER

## 2022-01-10 ENCOUNTER — OFFICE VISIT (OUTPATIENT)
Dept: FAMILY MEDICINE CLINIC | Facility: CLINIC | Age: 62
End: 2022-01-10
Payer: COMMERCIAL

## 2022-01-10 VITALS
HEART RATE: 57 BPM | SYSTOLIC BLOOD PRESSURE: 130 MMHG | WEIGHT: 188.2 LBS | BODY MASS INDEX: 31.36 KG/M2 | HEIGHT: 65 IN | DIASTOLIC BLOOD PRESSURE: 80 MMHG | RESPIRATION RATE: 16 BRPM | TEMPERATURE: 97.8 F | OXYGEN SATURATION: 98 %

## 2022-01-10 DIAGNOSIS — R80.9 PROTEINURIA, UNSPECIFIED TYPE: ICD-10-CM

## 2022-01-10 DIAGNOSIS — E78.5 HYPERLIPIDEMIA, UNSPECIFIED HYPERLIPIDEMIA TYPE: ICD-10-CM

## 2022-01-10 DIAGNOSIS — E11.8 CONTROLLED TYPE 2 DIABETES MELLITUS WITH COMPLICATION, WITHOUT LONG-TERM CURRENT USE OF INSULIN (HCC): ICD-10-CM

## 2022-01-10 DIAGNOSIS — E11.8 CONTROLLED DIABETES MELLITUS TYPE 2 WITH COMPLICATIONS, UNSPECIFIED WHETHER LONG TERM INSULIN USE (HCC): ICD-10-CM

## 2022-01-10 PROCEDURE — 4010F ACE/ARB THERAPY RXD/TAKEN: CPT | Performed by: FAMILY MEDICINE

## 2022-01-10 PROCEDURE — 3008F BODY MASS INDEX DOCD: CPT | Performed by: FAMILY MEDICINE

## 2022-01-10 PROCEDURE — 3066F NEPHROPATHY DOC TX: CPT | Performed by: FAMILY MEDICINE

## 2022-01-10 PROCEDURE — 99214 OFFICE O/P EST MOD 30 MIN: CPT | Performed by: FAMILY MEDICINE

## 2022-01-10 RX ORDER — LISINOPRIL 10 MG/1
10 TABLET ORAL DAILY
Qty: 90 TABLET | Refills: 1 | Status: SHIPPED | OUTPATIENT
Start: 2022-01-10 | End: 2022-05-18 | Stop reason: SDUPTHER

## 2022-01-10 RX ORDER — ATORVASTATIN CALCIUM 20 MG/1
20 TABLET, FILM COATED ORAL DAILY
Qty: 90 TABLET | Refills: 1 | Status: SHIPPED | OUTPATIENT
Start: 2022-01-10 | End: 2022-05-18 | Stop reason: SDUPTHER

## 2022-01-10 RX ORDER — GLYBURIDE 5 MG/1
5 TABLET ORAL 2 TIMES DAILY WITH MEALS
Qty: 180 TABLET | Refills: 1 | Status: SHIPPED | OUTPATIENT
Start: 2022-01-10 | End: 2022-05-24 | Stop reason: SDUPTHER

## 2022-01-10 NOTE — PROGRESS NOTES
Assessment/Plan:    Controlled type 2 diabetes mellitus with complication, without long-term current use of insulin ([facility])    Lab Results   Component Value Date    HGBA1C 7 5 (H) 09/20/2021       Lab Results   Component Value Date    HGBA1C 7 5 (H) 09/20/2021   Well controlled  POCT a1c 6 8  Continue glyburide 5 mg twice daily, metformin 1000, Jardiance 10 mg daily  On Ace and Lipitor 20 mg daily  Eye exam and foot exam up-to-date  Fu with labs in   Diagnoses and all orders for this visit:    Controlled diabetes mellitus type 2 with complications, unspecified whether long term insulin use ([facility])  -     lisinopril (ZESTRIL) 10 mg tablet; Take 1 tablet (10 mg total) by mouth daily    Proteinuria, unspecified type  -     lisinopril (ZESTRIL) 10 mg tablet; Take 1 tablet (10 mg total) by mouth daily    Hyperlipidemia, unspecified hyperlipidemia type  -     atorvastatin (LIPITOR) 20 mg tablet; Take 1 tablet (20 mg total) by mouth daily    Controlled type 2 diabetes mellitus with complication, without long-term current use of insulin ([facility])  -     glyBURIDE (DIABETA) 5 mg tablet; Take 1 tablet (5 mg total) by mouth 2 (two) times a day with meals  -     metFORMIN (GLUCOPHAGE) 1000 MG tablet; Take 1 tablet (1,000 mg total) by mouth daily with breakfast          Subjective: chronic conditions      Patient ID: Marlys Dancer is a 64 y o  male  with a ho DM2, CKD stage 3a, HLD, obesity  HPI  Last visit the a1c increased to 7 5 and metformin was increased  He is here for a diabetes recheck  Meds were refilled  The following portions of the patient's history were reviewed and updated as appropriate: allergies, current medications, past family history, past medical history, past social history, past surgical history and problem list     Review of Systems   Constitutional: Negative for activity change, appetite change, fever and unexpected weight change     HENT: Negative for ear pain, postnasal drip and rhinorrhea  Eyes: Negative for photophobia and pain  Respiratory: Negative for cough, shortness of breath and wheezing  Cardiovascular: Negative for chest pain, palpitations and leg swelling  Gastrointestinal: Negative for abdominal pain, blood in stool, nausea and vomiting  Endocrine: Negative for polydipsia and polyuria  Genitourinary: Negative for difficulty urinating, hematuria and urgency  Musculoskeletal: Negative for myalgias  Skin: Negative for rash  Neurological: Negative for dizziness  Psychiatric/Behavioral: Negative for confusion and sleep disturbance  PHQ-2/9 Depression Screening             Objective:      /80 (BP Location: Left arm, Patient Position: Sitting, Cuff Size: Adult)   Pulse 57   Temp 97 8 °F (36 6 °C) (Tympanic)   Resp 16   Ht 5' 5" (1 651 m)   Wt 85 4 kg (188 lb 3 2 oz)   SpO2 98%   BMI 31 32 kg/m²          Physical Exam  Constitutional:       Appearance: He is well-developed  HENT:      Head: Normocephalic and atraumatic  Right Ear: External ear normal       Left Ear: External ear normal       Mouth/Throat:      Pharynx: No oropharyngeal exudate  Eyes:      Conjunctiva/sclera: Conjunctivae normal       Pupils: Pupils are equal, round, and reactive to light  Cardiovascular:      Rate and Rhythm: Normal rate and regular rhythm  Heart sounds: Normal heart sounds  No murmur heard  No friction rub  No gallop  Pulmonary:      Effort: Pulmonary effort is normal  No respiratory distress  Breath sounds: Normal breath sounds  No wheezing or rales  Chest:      Chest wall: No tenderness  Abdominal:      General: Bowel sounds are normal  There is no distension  Palpations: Abdomen is soft  There is no mass  Tenderness: There is no abdominal tenderness  There is no rebound  Musculoskeletal:         General: Normal range of motion  Cervical back: Normal range of motion and neck supple     Skin:     General: Skin is warm and dry  Neurological:      Mental Status: He is alert and oriented to person, place, and time

## 2022-01-10 NOTE — ASSESSMENT & PLAN NOTE
Lab Results   Component Value Date    HGBA1C 7 5 (H) 09/20/2021       Lab Results   Component Value Date    HGBA1C 7 5 (H) 09/20/2021   Well controlled  POCT a1c 6 8  Continue glyburide 5 mg twice daily, metformin 1000, Jardiance 10 mg daily  On Ace and Lipitor 20 mg daily  Eye exam and foot exam up-to-date  Fu with labs in 6m

## 2022-04-18 DIAGNOSIS — E11.8 CONTROLLED TYPE 2 DIABETES MELLITUS WITH COMPLICATION, WITHOUT LONG-TERM CURRENT USE OF INSULIN (HCC): ICD-10-CM

## 2022-05-18 DIAGNOSIS — R80.9 PROTEINURIA, UNSPECIFIED TYPE: ICD-10-CM

## 2022-05-18 DIAGNOSIS — E78.5 HYPERLIPIDEMIA, UNSPECIFIED HYPERLIPIDEMIA TYPE: ICD-10-CM

## 2022-05-18 DIAGNOSIS — E11.8 CONTROLLED TYPE 2 DIABETES MELLITUS WITH COMPLICATION, WITHOUT LONG-TERM CURRENT USE OF INSULIN (HCC): ICD-10-CM

## 2022-05-18 DIAGNOSIS — E11.8 CONTROLLED DIABETES MELLITUS TYPE 2 WITH COMPLICATIONS, UNSPECIFIED WHETHER LONG TERM INSULIN USE (HCC): ICD-10-CM

## 2022-05-18 RX ORDER — LISINOPRIL 10 MG/1
10 TABLET ORAL DAILY
Qty: 90 TABLET | Refills: 1 | Status: SHIPPED | OUTPATIENT
Start: 2022-05-18

## 2022-05-18 RX ORDER — ATORVASTATIN CALCIUM 20 MG/1
20 TABLET, FILM COATED ORAL DAILY
Qty: 90 TABLET | Refills: 1 | Status: SHIPPED | OUTPATIENT
Start: 2022-05-18

## 2022-05-24 DIAGNOSIS — E11.8 CONTROLLED TYPE 2 DIABETES MELLITUS WITH COMPLICATION, WITHOUT LONG-TERM CURRENT USE OF INSULIN (HCC): ICD-10-CM

## 2022-05-24 RX ORDER — GLYBURIDE 5 MG/1
5 TABLET ORAL 2 TIMES DAILY WITH MEALS
Qty: 180 TABLET | Refills: 1 | Status: SHIPPED | OUTPATIENT
Start: 2022-05-24 | End: 2022-08-22

## 2022-09-26 DIAGNOSIS — R80.9 PROTEINURIA, UNSPECIFIED TYPE: ICD-10-CM

## 2022-09-26 DIAGNOSIS — E78.5 HYPERLIPIDEMIA, UNSPECIFIED HYPERLIPIDEMIA TYPE: ICD-10-CM

## 2022-09-26 DIAGNOSIS — E11.8 CONTROLLED TYPE 2 DIABETES MELLITUS WITH COMPLICATION, WITHOUT LONG-TERM CURRENT USE OF INSULIN (HCC): ICD-10-CM

## 2022-09-26 DIAGNOSIS — E11.8 CONTROLLED DIABETES MELLITUS TYPE 2 WITH COMPLICATIONS, UNSPECIFIED WHETHER LONG TERM INSULIN USE (HCC): ICD-10-CM

## 2022-09-27 RX ORDER — LISINOPRIL 10 MG/1
10 TABLET ORAL DAILY
Qty: 90 TABLET | Refills: 0 | Status: SHIPPED | OUTPATIENT
Start: 2022-09-27

## 2022-09-27 RX ORDER — ATORVASTATIN CALCIUM 20 MG/1
20 TABLET, FILM COATED ORAL DAILY
Qty: 90 TABLET | Refills: 0 | Status: SHIPPED | OUTPATIENT
Start: 2022-09-27

## 2022-09-27 RX ORDER — GLYBURIDE 5 MG/1
5 TABLET ORAL 2 TIMES DAILY WITH MEALS
Qty: 180 TABLET | Refills: 0 | Status: SHIPPED | OUTPATIENT
Start: 2022-09-27 | End: 2022-12-26

## 2022-09-27 NOTE — TELEPHONE ENCOUNTER
(Northern Irish speaking): Please advise Anup Ambriz    His PCP is out of the office but he meds were re-ordered    He needs to complete BW that was ordered before he sees Dr Sharyn Conley    he is overdue for blood work    Thank you

## 2023-01-13 DIAGNOSIS — E11.8 CONTROLLED TYPE 2 DIABETES MELLITUS WITH COMPLICATION, WITHOUT LONG-TERM CURRENT USE OF INSULIN (HCC): ICD-10-CM

## 2023-01-13 DIAGNOSIS — E78.5 HYPERLIPIDEMIA, UNSPECIFIED HYPERLIPIDEMIA TYPE: ICD-10-CM

## 2023-01-13 DIAGNOSIS — R80.9 PROTEINURIA, UNSPECIFIED TYPE: ICD-10-CM

## 2023-01-13 RX ORDER — LISINOPRIL 10 MG/1
10 TABLET ORAL DAILY
Qty: 90 TABLET | Refills: 0 | Status: SHIPPED | OUTPATIENT
Start: 2023-01-13

## 2023-01-13 RX ORDER — GLYBURIDE 5 MG/1
5 TABLET ORAL 2 TIMES DAILY WITH MEALS
Qty: 180 TABLET | Refills: 0 | Status: SHIPPED | OUTPATIENT
Start: 2023-01-13 | End: 2023-04-13

## 2023-01-13 RX ORDER — ATORVASTATIN CALCIUM 20 MG/1
20 TABLET, FILM COATED ORAL DAILY
Qty: 90 TABLET | Refills: 0 | Status: SHIPPED | OUTPATIENT
Start: 2023-01-13

## 2023-01-16 ENCOUNTER — OFFICE VISIT (OUTPATIENT)
Dept: FAMILY MEDICINE CLINIC | Facility: CLINIC | Age: 63
End: 2023-01-16

## 2023-01-16 VITALS
HEIGHT: 65 IN | WEIGHT: 181.4 LBS | OXYGEN SATURATION: 98 % | TEMPERATURE: 98.9 F | SYSTOLIC BLOOD PRESSURE: 129 MMHG | BODY MASS INDEX: 30.22 KG/M2 | RESPIRATION RATE: 16 BRPM | DIASTOLIC BLOOD PRESSURE: 77 MMHG | HEART RATE: 63 BPM

## 2023-01-16 DIAGNOSIS — N18.31 STAGE 3A CHRONIC KIDNEY DISEASE (HCC): ICD-10-CM

## 2023-01-16 DIAGNOSIS — Z13.0 SCREENING FOR DEFICIENCY ANEMIA: ICD-10-CM

## 2023-01-16 DIAGNOSIS — Z23 ENCOUNTER FOR VACCINATION: ICD-10-CM

## 2023-01-16 DIAGNOSIS — Z12.11 COLON CANCER SCREENING: ICD-10-CM

## 2023-01-16 DIAGNOSIS — E11.8 CONTROLLED TYPE 2 DIABETES MELLITUS WITH COMPLICATION, WITHOUT LONG-TERM CURRENT USE OF INSULIN (HCC): ICD-10-CM

## 2023-01-16 DIAGNOSIS — Z00.00 ANNUAL PHYSICAL EXAM: Primary | ICD-10-CM

## 2023-01-16 DIAGNOSIS — E78.2 MIXED HYPERLIPIDEMIA: ICD-10-CM

## 2023-01-16 DIAGNOSIS — Z12.5 SCREENING FOR MALIGNANT NEOPLASM OF PROSTATE: ICD-10-CM

## 2023-01-16 LAB
CREAT UR-MCNC: 42.7 MG/DL
MICROALBUMIN UR-MCNC: 23 MG/L (ref 0–20)
MICROALBUMIN/CREAT 24H UR: 54 MG/G CREATININE (ref 0–30)
SL AMB POCT HEMOGLOBIN AIC: 9.5 (ref ?–6.5)

## 2023-01-16 NOTE — ASSESSMENT & PLAN NOTE
Lab Results   Component Value Date    HGBA1C 7 5 (H) 09/20/2021   A1c uncontrolled at 9 5  Pt noncompliant with medication due to a language issue with the pharmacy  He has not used medication since November  Reminded to get rest of labs done  Continue glyburide 5 mg twice daily, metformin 1000, Jardiance 10 mg daily  On Ace and Lipitor 20 mg daily  Eye exam and foot exam due  Fu with labs in 6m

## 2023-01-16 NOTE — PROGRESS NOTES
Assessment/Plan:    Annual physical exam  Colonoscopy done 3/19/18 with removal of an adenoma  Needs repeat  Prostate screening accepted today  Ordered previously but pt never had labs done and they   Lung cancer screening not indicated  Controlled type 2 diabetes mellitus with complication, without long-term current use of insulin ([facility])    Lab Results   Component Value Date    HGBA1C 7 5 (H) 2021   A1c uncontrolled at 9 5  Pt noncompliant with medication due to a language issue with the pharmacy  He has not used medication since November  Reminded to get rest of labs done  Continue glyburide 5 mg twice daily, metformin 1000, Jardiance 10 mg daily  On Ace and Lipitor 20 mg daily  Eye exam and foot exam due  Fu with labs in 6m  Hyperlipidemia  Advised to restart lipitor  Diagnoses and all orders for this visit:    Annual physical exam    Controlled type 2 diabetes mellitus with complication, without long-term current use of insulin ([facility])  -     Microalbumin / creatinine urine ratio  -     Hemoglobin A1C; Future  -     POCT hemoglobin A1c    Colon cancer screening  -     Ambulatory referral for colonoscopy; Future    Screening for deficiency anemia  -     CBC and differential; Future    Mixed hyperlipidemia  -     Lipid panel; Future  -     Lipid panel; Future    Stage 3a chronic kidney disease (HCC)  -     Comprehensive metabolic panel; Future  -     Basic metabolic panel; Future    Screening for malignant neoplasm of prostate  -     PSA, Total Screen; Future    Encounter for vaccination  -     Pneumococcal Conjugate Vaccine 20-valent (Pcv20)  -     influenza vaccine, quadrivalent, recombinant, PF, 0 5 mL, for patients 18 yr+ ([facility]OK)          Subjective: Chronic conditions checkup     Patient ID: Rosamaria David is a 58 y o  male  HPI  Guyanese translation phone services were used today  The patient has been without all medicines since November    He tried to refill his medicines multiple times but the pharmacist did not speak Montenegrin  He was told that if this occurs in the future he should call our office and and we will help with any language barriers  The following portions of the patient's history were reviewed and updated as appropriate: allergies, current medications, past family history, past medical history, past social history, past surgical history and problem list     Review of Systems   Constitutional: Negative for activity change, appetite change, fever and unexpected weight change  HENT: Negative for ear pain, postnasal drip and rhinorrhea  Eyes: Negative for photophobia and pain  Respiratory: Negative for cough, shortness of breath and wheezing  Cardiovascular: Negative for chest pain, palpitations and leg swelling  Gastrointestinal: Negative for abdominal pain, blood in stool, nausea and vomiting  Endocrine: Negative for polydipsia and polyuria  Genitourinary: Negative for difficulty urinating, hematuria and urgency  Musculoskeletal: Negative for myalgias  Skin: Negative for rash  Neurological: Negative for dizziness  Psychiatric/Behavioral: Negative for confusion and sleep disturbance  PHQ-2/9 Depression Screening         [unfilled]    Objective:      /77 (BP Location: Left arm, Patient Position: Sitting, Cuff Size: Adult)   Pulse 63   Temp 98 9 °F (37 2 °C) (Tympanic)   Resp 16   Ht 5' 5" (1 651 m)   Wt 82 3 kg (181 lb 6 4 oz)   SpO2 98%   BMI 30 19 kg/m²          Physical Exam  Constitutional:       General: He is not in acute distress  Appearance: He is well-developed  He is not diaphoretic  HENT:      Head: Normocephalic and atraumatic  Right Ear: External ear normal       Left Ear: External ear normal       Nose: Nose normal    Eyes:      Conjunctiva/sclera: Conjunctivae normal       Pupils: Pupils are equal, round, and reactive to light     Cardiovascular:      Pulses: no weak pulses Dorsalis pedis pulses are 2+ on the right side and 2+ on the left side  Posterior tibial pulses are 2+ on the right side and 2+ on the left side  Pulmonary:      Effort: Pulmonary effort is normal  No respiratory distress  Feet:      Right foot:      Skin integrity: No ulcer, skin breakdown, erythema, warmth, callus or dry skin  Left foot:      Skin integrity: No ulcer, skin breakdown, erythema, warmth, callus or dry skin  Skin:     Findings: No rash  Neurological:      Mental Status: He is alert and oriented to person, place, and time  Psychiatric:         Behavior: Behavior normal        Patient's shoes and socks removed  Right Foot/Ankle   Right Foot Inspection  Skin Exam: skin normal and skin intact  No dry skin, no warmth, no callus, no erythema, no maceration, no abnormal color, no pre-ulcer, no ulcer and no callus  Toe Exam: ROM and strength within normal limits  Sensory   Vibration: intact  Proprioception: intact  Monofilament testing: intact    Vascular  Capillary refills: < 3 seconds  The right DP pulse is 2+  The right PT pulse is 2+  Left Foot/Ankle  Left Foot Inspection  Skin Exam: skin normal and skin intact  No dry skin, no warmth, no erythema, no maceration, normal color, no pre-ulcer, no ulcer and no callus  Toe Exam: ROM and strength within normal limits  Sensory   Vibration: intact  Proprioception: intact  Monofilament testing: intact    Vascular  Capillary refills: < 3 seconds  The left DP pulse is 2+  The left PT pulse is 2+       Assign Risk Category  No deformity present  No loss of protective sensation  No weak pulses  Risk: 0

## 2023-01-16 NOTE — ASSESSMENT & PLAN NOTE
Colonoscopy done 3/19/18 with removal of an adenoma  Needs repeat  Prostate screening accepted today  Ordered previously but pt never had labs done and they   Lung cancer screening not indicated

## 2023-01-16 NOTE — PATIENT INSTRUCTIONS

## 2023-01-16 NOTE — PROGRESS NOTES
[facility] PR[facility]    NAME: Gerhardt Barry  AGE: 58 y o  SEX: male  : 1960     DATE: 2023     Assessment and Plan:     Problem List Items Addressed This Visit        Endocrine    Controlled type 2 diabetes mellitus with complication, without long-term current use of insulin ([facility] [address] )       Lab Results   Component Value Date    HGBA1C 7 5 (H) 2021   A1c uncontrolled at 9 5  Pt noncompliant with medication due to a language issue with the pharmacy  He has not used medication since November  Reminded to get rest of labs done  Continue glyburide 5 mg twice daily, metformin 1000, Jardiance 10 mg daily  On Ace and Lipitor 20 mg daily  Eye exam and foot exam due  Fu with labs in 6m  Relevant Orders    Microalbumin / creatinine urine ratio    Hemoglobin A1C    POCT hemoglobin A1c (Completed)       Genitourinary    Stage 3a chronic kidney disease (HCC)    Relevant Orders    Comprehensive metabolic panel    Basic metabolic panel       Other    Annual physical exam - Primary     Colonoscopy done 3/19/18 with removal of an adenoma  Needs repeat  Prostate screening accepted today  Ordered previously but pt never had labs done and they   Lung cancer screening not indicated  Hyperlipidemia     Advised to restart lipitor            Relevant Orders    Lipid panel    Lipid panel   Other Visit Diagnoses     Colon cancer screening        Relevant Orders    Ambulatory referral for colonoscopy    Screening for deficiency anemia        Relevant Orders    CBC and differential    Screening for malignant neoplasm of prostate        Relevant Orders    PSA, Total Screen    Encounter for vaccination        Relevant Orders    Pneumococcal Conjugate Vaccine 20-valent (Pcv20)    influenza vaccine, quadrivalent, recombinant, PF, 0 5 mL, for patients 18 yr+ ([facility])          Immunizations and preventive care screenings were discussed with patient today  Appropriate education was printed on patient's after visit summary  Given flu and prevnar today  Discussed risks and benefits of prostate cancer screening  We discussed the controversial history of PSA screening for prostate cancer in the [place] as well as the risk of over detection and over treatment of prostate cancer by way of PSA screening  The patient understands that PSA blood testing is an imperfect way to screen for prostate cancer and that elevated PSA levels in the blood may also be caused by infection, inflammation, prostatic trauma or manipulation, urological procedures, or by benign prostatic enlargement  Counseling:  • Alcohol/drug use: discussed moderation in alcohol intake, the recommendations for healthy alcohol use, and avoidance of illicit drug use  • Dental Health: discussed importance of regular tooth brushing, flossing, and dental visits  BMI Counseling: Body mass index is 30 19 kg/m²  The BMI is above normal  Nutrition recommendations include limiting drinks that contain sugar  Exercise recommendations include strength training exercises  Rationale for BMI follow-up plan is due to patient being overweight or obese  Return in 6 months (on 7/16/2023)  Chief Complaint:     Chief Complaint   Patient presents with   • 6 month follow up        History of Present Illness:     Adult Annual Physical   Patient here for a comprehensive physical exam  The patient reports no problems  [employer] translation services used today  Diet and Physical Activity  · Diet/Nutrition: well balanced diet  · Exercise: no formal exercise  Depression Screening  PHQ-2/9 Depression Screening         General Health  · Sleep: sleeps well  · Hearing: no issues  · Vision: most recent eye exam >1 year ago  · Dental: regular dental visits          Health  · Symptoms include: none     Review of Systems:     Review of Systems Constitutional: Negative for activity change, appetite change, fever and unexpected weight change  HENT: Negative for ear pain, postnasal drip and rhinorrhea  Eyes: Negative for photophobia and pain  Respiratory: Negative for cough, shortness of breath and wheezing  Cardiovascular: Negative for chest pain, palpitations and leg swelling  Gastrointestinal: Negative for abdominal pain, blood in stool, nausea and vomiting  Endocrine: Negative for polydipsia and polyuria  Genitourinary: Negative for difficulty urinating, hematuria and urgency  Musculoskeletal: Negative for myalgias  Skin: Negative for rash  Neurological: Negative for dizziness  Psychiatric/Behavioral: Negative for confusion and sleep disturbance  Past Medical History:     Past Medical History:   Diagnosis Date   • Diabetes mellitus ([facility] [address] )    • Epigastric abdominal pain    • Epigastric pain    • GERD (gastroesophageal reflux disease)    • Hypertension       Past Surgical History:     Past Surgical History:   Procedure Laterality Date   • EGD AND COLONOSCOPY N/A 3/19/2018    Procedure: EGD AND COLONOSCOPY;  Surgeon: Miryam Milton MD;  Location: [facility] GI [facility]; Service: Gastroenterology   • NO PAST SURGERIES        Family History:     Family History   Problem Relation Age of Onset   • Cancer Father       Social History:     Social History     Socioeconomic History   • Marital status: Single     Spouse name: None   • Number of children: None   • Years of education: None   • Highest education level: None   Occupational History   • None   Tobacco Use   • Smoking status: Never   • Smokeless tobacco: Never   Vaping Use   • Vaping Use: Never used   Substance and Sexual Activity   • Alcohol use: No     Comment: social alcohol use   • Drug use: No   • Sexual activity: Not Currently   Other Topics Concern   • None   Social History Narrative    Caffeine use  Does not exercise  Full-time employment  Nonuser of seat belts  [facility]  Social Determinants of Health     Financial Resource Strain: Not on file   Food Insecurity: Not on file   Transportation Needs: Not on file   Physical Activity: Not on file   Stress: Not on file   Social Connections: Not on file   Intimate Partner Violence: Not on file   Housing Stability: Not on file      Current Medications:     Current Outpatient Medications   Medication Sig Dispense Refill   • atorvastatin (LIPITOR) 20 mg tablet Take 1 tablet (20 mg total) by mouth daily 90 tablet 0   • Empagliflozin (Jardiance) 10 MG TABS tablet Take 1 tablet (10 mg total) by mouth daily 90 tablet 0   • glyBURIDE (DIABETA) 5 mg tablet Take 1 tablet (5 mg total) by mouth 2 (two) times a day with meals 180 tablet 0   • lisinopril (ZESTRIL) 10 mg tablet Take 1 tablet (10 mg total) by mouth daily 90 tablet 0   • metFORMIN (GLUCOPHAGE) 1000 MG tablet Take 1 tablet (1,000 mg total) by mouth daily with breakfast 90 tablet 0   • betamethasone valerate (VALISONE) 0 1 % cream Apply topically 2 (two) times a day 30 g 5   • glucose blood (TRUE METRIX BLOOD GLUCOSE TEST) test strip Check twice daily (Patient not taking: Reported on 1/10/2022) 100 each 5     No current facility-administered medications for this visit  Allergies:     No Known Allergies   Physical Exam:     /77 (BP Location: Left arm, Patient Position: Sitting, Cuff Size: Adult)   Pulse 63   Temp 98 9 °F (37 2 °C) (Tympanic)   Resp 16   Ht 5' 5" (1 651 m)   Wt 82 3 kg (181 lb 6 4 oz)   SpO2 98%   BMI 30 19 kg/m²     Physical Exam  Constitutional:       General: He is not in acute distress  Appearance: He is well-developed  HENT:      Head: Normocephalic and atraumatic  Eyes:      General: No scleral icterus  Conjunctiva/sclera: Conjunctivae normal       Pupils: Pupils are equal, round, and reactive to light  Cardiovascular:      Rate and Rhythm: Normal rate and regular rhythm        Heart sounds: Normal heart sounds  No murmur heard  No friction rub  No gallop  Pulmonary:      Effort: Pulmonary effort is normal  No respiratory distress  Breath sounds: Normal breath sounds  No wheezing or rales  Abdominal:      General: Bowel sounds are normal  There is no distension  Palpations: Abdomen is soft  There is no mass  Tenderness: There is no abdominal tenderness  There is no guarding  Musculoskeletal:         General: No tenderness  Cervical back: Normal range of motion  Skin:     General: Skin is warm and dry  Findings: No rash  Neurological:      Mental Status: He is alert and oriented to person, place, and time  Cranial Nerves: No cranial nerve deficit  Motor: No abnormal muscle tone            Azalea Salgado MD  [address] [place] [address]

## 2023-02-09 ENCOUNTER — VBI (OUTPATIENT)
Dept: ADMINISTRATIVE | Facility: OTHER | Age: 63
End: 2023-02-09

## 2023-02-09 LAB
LEFT EYE DIABETIC RETINOPATHY: NORMAL
RIGHT EYE DIABETIC RETINOPATHY: NORMAL
SEVERITY (EYE EXAM): NORMAL

## 2023-04-26 DIAGNOSIS — E11.8 CONTROLLED TYPE 2 DIABETES MELLITUS WITH COMPLICATION, WITHOUT LONG-TERM CURRENT USE OF INSULIN (HCC): ICD-10-CM

## 2023-04-26 DIAGNOSIS — R80.9 PROTEINURIA, UNSPECIFIED TYPE: ICD-10-CM

## 2023-04-26 DIAGNOSIS — E78.5 HYPERLIPIDEMIA, UNSPECIFIED HYPERLIPIDEMIA TYPE: ICD-10-CM

## 2023-04-26 RX ORDER — LISINOPRIL 10 MG/1
10 TABLET ORAL DAILY
Qty: 90 TABLET | Refills: 0 | Status: SHIPPED | OUTPATIENT
Start: 2023-04-26

## 2023-04-26 RX ORDER — GLYBURIDE 5 MG/1
5 TABLET ORAL 2 TIMES DAILY WITH MEALS
Qty: 180 TABLET | Refills: 0 | Status: SHIPPED | OUTPATIENT
Start: 2023-04-26 | End: 2023-07-25

## 2023-04-26 RX ORDER — ATORVASTATIN CALCIUM 20 MG/1
20 TABLET, FILM COATED ORAL DAILY
Qty: 90 TABLET | Refills: 0 | Status: SHIPPED | OUTPATIENT
Start: 2023-04-26

## 2023-07-24 ENCOUNTER — OFFICE VISIT (OUTPATIENT)
Dept: FAMILY MEDICINE CLINIC | Facility: CLINIC | Age: 63
End: 2023-07-24
Payer: COMMERCIAL

## 2023-07-24 VITALS
DIASTOLIC BLOOD PRESSURE: 76 MMHG | HEIGHT: 65 IN | TEMPERATURE: 97.5 F | SYSTOLIC BLOOD PRESSURE: 129 MMHG | WEIGHT: 179 LBS | BODY MASS INDEX: 29.82 KG/M2 | RESPIRATION RATE: 16 BRPM | OXYGEN SATURATION: 99 % | HEART RATE: 70 BPM

## 2023-07-24 DIAGNOSIS — Z12.11 COLON CANCER SCREENING: ICD-10-CM

## 2023-07-24 DIAGNOSIS — E11.8 CONTROLLED TYPE 2 DIABETES MELLITUS WITH COMPLICATION, WITHOUT LONG-TERM CURRENT USE OF INSULIN (HCC): Primary | ICD-10-CM

## 2023-07-24 DIAGNOSIS — E78.5 HYPERLIPIDEMIA, UNSPECIFIED HYPERLIPIDEMIA TYPE: ICD-10-CM

## 2023-07-24 DIAGNOSIS — R80.9 PROTEINURIA, UNSPECIFIED TYPE: ICD-10-CM

## 2023-07-24 LAB — SL AMB POCT HEMOGLOBIN AIC: 7.4 (ref ?–6.5)

## 2023-07-24 PROCEDURE — 82570 ASSAY OF URINE CREATININE: CPT | Performed by: FAMILY MEDICINE

## 2023-07-24 PROCEDURE — 99214 OFFICE O/P EST MOD 30 MIN: CPT | Performed by: FAMILY MEDICINE

## 2023-07-24 PROCEDURE — 82043 UR ALBUMIN QUANTITATIVE: CPT | Performed by: FAMILY MEDICINE

## 2023-07-24 PROCEDURE — 83036 HEMOGLOBIN GLYCOSYLATED A1C: CPT | Performed by: FAMILY MEDICINE

## 2023-07-24 RX ORDER — ATORVASTATIN CALCIUM 20 MG/1
20 TABLET, FILM COATED ORAL DAILY
Qty: 90 TABLET | Refills: 3 | Status: SHIPPED | OUTPATIENT
Start: 2023-07-24

## 2023-07-24 RX ORDER — LISINOPRIL 10 MG/1
10 TABLET ORAL DAILY
Qty: 90 TABLET | Refills: 3 | Status: SHIPPED | OUTPATIENT
Start: 2023-07-24

## 2023-07-24 RX ORDER — GLYBURIDE 5 MG/1
5 TABLET ORAL 2 TIMES DAILY WITH MEALS
Qty: 180 TABLET | Refills: 0 | Status: SHIPPED | OUTPATIENT
Start: 2023-07-24 | End: 2023-10-22

## 2023-07-24 NOTE — PROGRESS NOTES
Assessment/Plan:    Controlled type 2 diabetes mellitus with complication, without long-term current use of insulin ([facility])    Lab Results   Component Value Date    HGBA1C 7.4 (A) 07/24/2023   Labs have not been done. On jardiance 10, glipizide bid and metformin. He is also on lipitor. Reminded to get labs. Bp good on lisinopril. Urine for protein collected today. Diagnoses and all orders for this visit:    Controlled type 2 diabetes mellitus with complication, without long-term current use of insulin ([address])  -     Cancel: Albumin / creatinine urine ratio; Future  -     glyBURIDE (DIABETA) 5 mg tablet; Take 1 tablet (5 mg total) by mouth 2 (two) times a day with meals  -     lisinopril (ZESTRIL) 10 mg tablet; Take 1 tablet (10 mg total) by mouth daily  -     POCT hemoglobin A1c  -     Albumin / creatinine urine ratio; Future  -     Albumin / creatinine urine ratio    Colon cancer screening  -     Ambulatory Referral to Gastroenterology; Future    Proteinuria, unspecified type  -     lisinopril (ZESTRIL) 10 mg tablet; Take 1 tablet (10 mg total) by mouth daily    Hyperlipidemia, unspecified hyperlipidemia type  -     atorvastatin (LIPITOR) 20 mg tablet; Take 1 tablet (20 mg total) by mouth daily          Subjective: chronic conditions      Patient ID: Catina Philippe is a 61 y.o. male. [employer]  [place]ori translation phone services were used today. Last visit the pt had stopped all medication because of a language barrier with the pharmacy. He brings in his meds today and is only missing glyburide. The following portions of the patient's history were reviewed and updated as appropriate: allergies, current medications, past family history, past medical history, past social history, past surgical history and problem list.    Review of Systems   Constitutional: Negative for activity change, appetite change, fever and unexpected weight change.    HENT: Negative for ear pain, postnasal drip and rhinorrhea. Eyes: Negative for photophobia and pain. Respiratory: Negative for cough, shortness of breath and wheezing. Cardiovascular: Negative for chest pain, palpitations and leg swelling. Gastrointestinal: Negative for abdominal pain, blood in stool, nausea and vomiting. Endocrine: Negative for polydipsia and polyuria. Genitourinary: Negative for difficulty urinating, hematuria and urgency. Musculoskeletal: Negative for myalgias. Skin: Negative for rash. Neurological: Negative for dizziness. Psychiatric/Behavioral: Negative for confusion and sleep disturbance. PHQ-2/9 Depression Screening    Little interest or pleasure in doing things: 0 - not at all  Feeling down, depressed, or hopeless: 0 - not at all  PHQ-2 Score: 0  PHQ-2 Interpretation: Negative depression screen       [unfilled]    Objective:      /76 (BP Location: Left arm, Patient Position: Sitting, Cuff Size: Adult)   Pulse 70   Temp 97.5 °F (36.4 °C) (Tympanic)   Resp 16   Ht 5' 5" (1.651 m)   Wt 81.2 kg (179 lb)   SpO2 99%   BMI 29.79 kg/m²          Physical Exam  Constitutional:       Appearance: He is well-developed. HENT:      Head: Normocephalic and atraumatic. Right Ear: External ear normal.      Left Ear: External ear normal.      Mouth/Throat:      Pharynx: No oropharyngeal exudate. Eyes:      Conjunctiva/sclera: Conjunctivae normal.      Pupils: Pupils are equal, round, and reactive to light. Cardiovascular:      Rate and Rhythm: Normal rate and regular rhythm. Heart sounds: Normal heart sounds. No murmur heard. No friction rub. No gallop. Pulmonary:      Effort: Pulmonary effort is normal. No respiratory distress. Breath sounds: Normal breath sounds. No wheezing or rales. Chest:      Chest wall: No tenderness. Abdominal:      General: Bowel sounds are normal. There is no distension. Palpations: Abdomen is soft. There is no mass. Tenderness:  There is no abdominal tenderness. There is no rebound. Musculoskeletal:         General: Normal range of motion. Cervical back: Normal range of motion and neck supple. Skin:     General: Skin is warm and dry. Neurological:      Mental Status: He is alert and oriented to person, place, and time.

## 2023-07-24 NOTE — ASSESSMENT & PLAN NOTE
Lab Results   Component Value Date    HGBA1C 7.4 (A) 07/24/2023   Labs have not been done. On jardiance 10, glipizide bid and metformin. He is also on lipitor. Reminded to get labs. Bp good on lisinopril. Urine for protein collected today.

## 2023-07-25 LAB
CREAT UR-MCNC: 85.8 MG/DL
MICROALBUMIN UR-MCNC: 10.5 MG/L (ref 0–20)
MICROALBUMIN/CREAT 24H UR: 12 MG/G CREATININE (ref 0–30)

## 2023-08-28 DIAGNOSIS — E11.8 CONTROLLED TYPE 2 DIABETES MELLITUS WITH COMPLICATION, WITHOUT LONG-TERM CURRENT USE OF INSULIN (HCC): ICD-10-CM

## 2023-10-16 LAB
ALBUMIN SERPL-MCNC: 4.4 G/DL (ref 3.6–5.1)
ALBUMIN/GLOB SERPL: 1.7 (CALC) (ref 1–2.5)
ALP SERPL-CCNC: 67 U/L (ref 35–144)
ALT SERPL-CCNC: 45 U/L (ref 9–46)
AST SERPL-CCNC: 31 U/L (ref 10–35)
BASOPHILS # BLD AUTO: 30 CELLS/UL (ref 0–200)
BASOPHILS NFR BLD AUTO: 0.4 %
BILIRUB SERPL-MCNC: 0.6 MG/DL (ref 0.2–1.2)
BUN SERPL-MCNC: 21 MG/DL (ref 7–25)
BUN/CREAT SERPL: 15 (CALC) (ref 6–22)
CALCIUM SERPL-MCNC: 9.9 MG/DL (ref 8.6–10.3)
CHLORIDE SERPL-SCNC: 105 MMOL/L (ref 98–110)
CHOLEST SERPL-MCNC: 120 MG/DL
CHOLEST/HDLC SERPL: 2.7 (CALC)
CO2 SERPL-SCNC: 30 MMOL/L (ref 20–32)
CREAT SERPL-MCNC: 1.41 MG/DL (ref 0.7–1.35)
EOSINOPHIL # BLD AUTO: 340 CELLS/UL (ref 15–500)
EOSINOPHIL NFR BLD AUTO: 4.6 %
ERYTHROCYTE [DISTWIDTH] IN BLOOD BY AUTOMATED COUNT: 13.2 % (ref 11–15)
GFR/BSA.PRED SERPLBLD CYS-BASED-ARV: 56 ML/MIN/1.73M2
GLOBULIN SER CALC-MCNC: 2.6 G/DL (CALC) (ref 1.9–3.7)
GLUCOSE SERPL-MCNC: 137 MG/DL (ref 65–99)
HCT VFR BLD AUTO: 45.5 % (ref 38.5–50)
HDLC SERPL-MCNC: 45 MG/DL
HGB BLD-MCNC: 15.3 G/DL (ref 13.2–17.1)
LDLC SERPL CALC-MCNC: 59 MG/DL (CALC)
LYMPHOCYTES # BLD AUTO: 2849 CELLS/UL (ref 850–3900)
LYMPHOCYTES NFR BLD AUTO: 38.5 %
MCH RBC QN AUTO: 30.2 PG (ref 27–33)
MCHC RBC AUTO-ENTMCNC: 33.6 G/DL (ref 32–36)
MCV RBC AUTO: 89.7 FL (ref 80–100)
MONOCYTES # BLD AUTO: 570 CELLS/UL (ref 200–950)
MONOCYTES NFR BLD AUTO: 7.7 %
NEUTROPHILS # BLD AUTO: 3611 CELLS/UL (ref 1500–7800)
NEUTROPHILS NFR BLD AUTO: 48.8 %
NONHDLC SERPL-MCNC: 75 MG/DL (CALC)
PLATELET # BLD AUTO: 166 THOUSAND/UL (ref 140–400)
PMV BLD REES-ECKER: 10.6 FL (ref 7.5–12.5)
POTASSIUM SERPL-SCNC: 4.4 MMOL/L (ref 3.5–5.3)
PROT SERPL-MCNC: 7 G/DL (ref 6.1–8.1)
PSA SERPL-MCNC: 1.47 NG/ML
RBC # BLD AUTO: 5.07 MILLION/UL (ref 4.2–5.8)
SODIUM SERPL-SCNC: 142 MMOL/L (ref 135–146)
TRIGL SERPL-MCNC: 79 MG/DL
WBC # BLD AUTO: 7.4 THOUSAND/UL (ref 3.8–10.8)

## 2023-10-17 DIAGNOSIS — E11.8 CONTROLLED TYPE 2 DIABETES MELLITUS WITH COMPLICATION, WITHOUT LONG-TERM CURRENT USE OF INSULIN (HCC): ICD-10-CM

## 2023-10-25 DIAGNOSIS — E11.8 CONTROLLED TYPE 2 DIABETES MELLITUS WITH COMPLICATION, WITHOUT LONG-TERM CURRENT USE OF INSULIN (HCC): ICD-10-CM

## 2023-10-25 NOTE — TELEPHONE ENCOUNTER
Message left on voicemail:     Yes, I'm calling for patient Ravi Archibald. Lortiffany Bowels Name Joe Date of birth 1960 I'm calling to request a refill for his jardiance, but also to be at a new pharmacy. The new pharmacy is [facility]. You can fax the prescription over to [phone]. His next fill will need to be filled there at [facility]. If you have any questions, you can contact us at [phone] and reference this case number [id number]. That is for Sacha Coronel, Date of birth 1960 for his Jardiance 10 milligram. Needs a new prescription to fax at [facility]. Home delivery at [facility]. Appreciate your time. Have a great day. Thanks.

## 2023-11-11 ENCOUNTER — PREP FOR PROCEDURE (OUTPATIENT)
Dept: GASTROENTEROLOGY | Facility: CLINIC | Age: 63
End: 2023-11-11

## 2023-11-11 ENCOUNTER — TELEPHONE (OUTPATIENT)
Dept: GASTROENTEROLOGY | Facility: CLINIC | Age: 63
End: 2023-11-11

## 2023-11-11 DIAGNOSIS — K63.5 POLYP OF COLON, UNSPECIFIED PART OF COLON, UNSPECIFIED TYPE: Primary | ICD-10-CM

## 2023-11-11 NOTE — TELEPHONE ENCOUNTER
11/11/23  Screened by: Rusty Epperson MA    Referring Provider     Pre- Screening: Body mass index is 29.79 kg/m². Has patient been referred for a routine screening Colonoscopy? yes  Is the patient between 43-73 years old? yes      Previous Colonoscopy yes   If yes:    Date: 2018    Facility: [facility]    Reason: Polyps      SCHEDULING STAFF: If the patient is between 39yrs-51yrs, please advise patient to confirm benefits/coverage with their insurance company for a routine screening colonoscopy, some insurance carriers will only cover at [address] [place] [address], [facility] or older. If the patient is over 66years old, please schedule an office visit. Does the patient want to see a Gastroenterologist prior to their procedure OR are they having any GI symptoms? no    Has the patient been hospitalized or had abdominal surgery in the past 6 months? no    Does the patient use supplemental oxygen? no    Does the patient take Coumadin, Lovenox, Plavix, Elliquis, Xarelto, or other blood thinning medication? no    Has the patient had a stroke, cardiac event, or stent placed in the past year? no    SCHEDULING STAFF: If patient answers NO to above questions, then schedule procedure. If patient answers YES to above questions, then schedule office appointment. If patient is between 45yrs - 49yrs, please advise patient that we will have to confirm benefits & coverage with their insurance company for a routine screening colonoscopy.     Scheduled date of colonoscopy (as of today): 2/5/24  Physician performing colonoscopy:Dr.Jaiyeola  Location of colonoscopy:  Bowel prep reviewed with patient: Miralax/Dulcolax  Instructions reviewed with patient by: Kelsie/rene  Clearances: N/A

## 2023-11-27 DIAGNOSIS — E11.8 CONTROLLED TYPE 2 DIABETES MELLITUS WITH COMPLICATION, WITHOUT LONG-TERM CURRENT USE OF INSULIN (HCC): ICD-10-CM

## 2023-11-29 RX ORDER — GLYBURIDE 5 MG/1
5 TABLET ORAL 2 TIMES DAILY WITH MEALS
Qty: 180 TABLET | Refills: 0 | Status: SHIPPED | OUTPATIENT
Start: 2023-11-29 | End: 2024-02-27

## 2024-01-02 DIAGNOSIS — E78.5 HYPERLIPIDEMIA, UNSPECIFIED HYPERLIPIDEMIA TYPE: ICD-10-CM

## 2024-01-02 DIAGNOSIS — E11.8 CONTROLLED TYPE 2 DIABETES MELLITUS WITH COMPLICATION, WITHOUT LONG-TERM CURRENT USE OF INSULIN (HCC): ICD-10-CM

## 2024-01-02 DIAGNOSIS — R80.9 PROTEINURIA, UNSPECIFIED TYPE: ICD-10-CM

## 2024-01-02 RX ORDER — GLYBURIDE 5 MG/1
5 TABLET ORAL 2 TIMES DAILY WITH MEALS
Qty: 180 TABLET | Refills: 0 | Status: SHIPPED | OUTPATIENT
Start: 2024-01-02 | End: 2024-04-01

## 2024-01-02 RX ORDER — ATORVASTATIN CALCIUM 20 MG/1
20 TABLET, FILM COATED ORAL DAILY
Qty: 90 TABLET | Refills: 3 | Status: SHIPPED | OUTPATIENT
Start: 2024-01-02

## 2024-01-02 RX ORDER — LISINOPRIL 10 MG/1
10 TABLET ORAL DAILY
Qty: 90 TABLET | Refills: 3 | Status: SHIPPED | OUTPATIENT
Start: 2024-01-02

## 2024-01-23 ENCOUNTER — TELEPHONE (OUTPATIENT)
Dept: GASTROENTEROLOGY | Facility: CLINIC | Age: 64
End: 2024-01-23

## 2024-01-23 ENCOUNTER — TELEPHONE (OUTPATIENT)
Dept: GASTROENTEROLOGY | Facility: MEDICAL CENTER | Age: 64
End: 2024-01-23

## 2024-01-23 NOTE — TELEPHONE ENCOUNTER
Did you remind:    You will receive a call a day prior to let you know when to arrive.  Yes    Do you have a copy of your instructions? No. If no, explain: LVM sent via [employer].    Did you remind them of special diets if applicable? Yes    Did you remind patient to start clear liquid diet if applicable? Yes    Did you remind patient to hold:  Yes Jaurdiance 4 days      Do you have any other questions or concerns? No/LVM

## 2024-01-23 NOTE — TELEPHONE ENCOUNTER
Pt returning call to confirm his procedure. Procedure confirmed. Advised pt he will get a call on 2/2 with procedure time. Pt is aware of hold on [employer].

## 2024-02-01 ENCOUNTER — TELEPHONE (OUTPATIENT)
Dept: GASTROENTEROLOGY | Facility: HOSPITAL | Age: 64
End: 2024-02-01

## 2024-02-04 RX ORDER — SODIUM CHLORIDE, SODIUM LACTATE, POTASSIUM CHLORIDE, CALCIUM CHLORIDE 600; 310; 30; 20 MG/100ML; MG/100ML; MG/100ML; MG/100ML
125 INJECTION, SOLUTION INTRAVENOUS CONTINUOUS
OUTPATIENT
Start: 2024-02-04

## 2024-02-05 ENCOUNTER — ANESTHESIA (OUTPATIENT)
Dept: GASTROENTEROLOGY | Facility: HOSPITAL | Age: 64
End: 2024-02-05

## 2024-02-05 ENCOUNTER — HOSPITAL ENCOUNTER (OUTPATIENT)
Dept: GASTROENTEROLOGY | Facility: HOSPITAL | Age: 64
Setting detail: OUTPATIENT SURGERY
Discharge: HOME | End: 2024-02-05
Attending: INTERNAL MEDICINE

## 2024-02-05 ENCOUNTER — ANESTHESIA EVENT (OUTPATIENT)
Dept: GASTROENTEROLOGY | Facility: HOSPITAL | Age: 64
End: 2024-02-05

## 2024-02-05 VITALS
HEIGHT: 66 IN | OXYGEN SATURATION: 97 % | BODY MASS INDEX: 30.53 KG/M2 | HEART RATE: 72 BPM | SYSTOLIC BLOOD PRESSURE: 135 MMHG | WEIGHT: 190 LBS | TEMPERATURE: 98.5 F | DIASTOLIC BLOOD PRESSURE: 78 MMHG | RESPIRATION RATE: 16 BRPM

## 2024-02-05 DIAGNOSIS — K63.5 POLYP OF COLON, UNSPECIFIED PART OF COLON, UNSPECIFIED TYPE: ICD-10-CM

## 2024-02-05 PROBLEM — I10 HTN (HYPERTENSION): Status: ACTIVE | Noted: 2024-02-05

## 2024-02-05 PROBLEM — K21.9 GASTROESOPHAGEAL REFLUX DISEASE: Status: ACTIVE | Noted: 2024-02-05

## 2024-02-05 NOTE — H&P
"H&P EXAM - Outpatient Endoscopy   Elías Bess 63 y.o. male MRN: [id number]    [facility]   Encounter: [id number]        History and Physical - [facility]  Elías Bess 63 y.o. male MRN: [id number]                  HPI: Elías Bess is a 63 y.o. year old male who presents for history of colon polyps      REVIEW OF SYSTEMS: Per the HPI, and otherwise unremarkable.    Historical Information   Past Medical History:   Diagnosis Date    Diabetes mellitus (HCC)     Epigastric abdominal pain     Epigastric pain     GERD (gastroesophageal reflux disease)     Hypertension      Past Surgical History:   Procedure Laterality Date    EGD AND COLONOSCOPY N/A 3/19/2018    Procedure: EGD AND COLONOSCOPY;  Surgeon: Will Ricci MD;  Location: [facility];  Service: Gastroenterology    NO PAST SURGERIES       Social History   Social History     Substance and Sexual Activity   Alcohol Use No    Comment: social alcohol use     Social History     Substance and Sexual Activity   Drug Use No     Social History     Tobacco Use   Smoking Status Never   Smokeless Tobacco Never     Family History   Problem Relation Age of Onset    Cancer Father        Meds/Allergies     (Not in a hospital admission)      No Known Allergies    Objective     /78   Pulse 72   Temp 98.5 °F (36.9 °C) (Temporal)   Resp 16   Ht 5' 6\" (1.676 m)   Wt 86.2 kg (190 lb)   SpO2 97%   BMI 30.67 kg/m²       PHYSICAL EXAM    Gen: NAD  CV: RRR  CHEST: Clear  ABD: soft, NT/ND  EXT: no edema      ASSESSMENT/PLAN:  This is a 63 y.o. year old male here for colonoscopy.  He was scheduled for colonoscopy however took Jardiance 48 hours ago when he was supposed to hold the medication for 4 days.  I discussed the case with anesthesia and given this his procedure will be canceled and rescheduled for another time.  The office will be contacted to review the medication instructions again " with the patient      b

## 2024-02-16 ENCOUNTER — TELEPHONE (OUTPATIENT)
Dept: GASTROENTEROLOGY | Facility: CLINIC | Age: 64
End: 2024-02-16

## 2024-02-16 NOTE — TELEPHONE ENCOUNTER
----- Message from Rajani Centeno sent at 2/9/2024  9:08 AM EST -----    ----- Message -----  From: Diana M Jaiyeola, MD  Sent: 2/5/2024   7:53 AM EST  To: Gastroenterology [facility]    Please reschedule this patient's colonoscopy for next available.  He was scheduled for today but took his Jardiance 48 hours ago.  Please also review all the medication instructions with him again as well.  Thank you

## 2024-02-16 NOTE — TELEPHONE ENCOUNTER
HOSPITALIST PROGRESS NOTE:    Date of Admit:  3/12/2021  9:33 AM  Date of Service:  3/14/2021    Chief Complaint:  Abdominal pain     Subjective:  No major overnight events.  Abdominal pain slowly improving.  Had some bowel movements yesterday.  was feeling nauseous this morning and cough up phlegm.  Denies any fever, chills chest pain.    Review of Systems:  CONSTITUTIONAL:  Denies fever, chills, rigors, and fatigue.  HEENT:  Denies HA (headache), blurry vision and double vision.  CARDIOVASCULAR:  Denies  chest pain or palpitations.   RESPIRATORY:  Denies cough and SOB (shortness of breath).   GASTROINTESTINAL:  Denies  nausea/vomiting, diarrhea    Vitals 24-Hour Range Last Value   Temperature Temp  Min: 97.7 °F (36.5 °C)  Max: 98.7 °F (37.1 °C) 98.7 °F (37.1 °C) (03/14/21 0836)   Pulse Pulse  Min: 95  Max: 107 (!) 104 (03/14/21 0836)   Respiratory Resp  Min: 16  Max: 20 16 (03/14/21 0836)   Non-Invasive  Blood Pressure BP  Min: 125/72  Max: 140/77 (!) 137/99 (03/14/21 0836)   Pulse Oximetry SpO2  Min: 95 %  Max: 98 % 98 % (03/14/21 0836)     General Appearance - Alert,  no acute distress  HEENT - conjunctivae clear  Lungs - diminished breath sounds in both lung bases, respirations unlabored, no wheezing   Heart - tachycardic, irregular rate and rhythm, S1 and S2 normal.  No murmur, rub or gallop   Abdomen - Soft,  bowel sounds active all four quadrants,  no hepatosplenomegaly,less tenderness, ,no guarding rigidity  Neurologic - CN (cranial nerves) II-XII intact.  No focal deficits.      Laboratory Results:  Recent Labs   Lab 03/14/21  0633   SODIUM 135   POTASSIUM 4.0   CHLORIDE 106   CO2 23   BUN 10   CREATININE 0.81   GLUCOSE 87   WBC 11.0   HGB 10.2*   HCT 31.7*          No results found    Medications/Infusions:  Scheduled:    • latanoprost  1 drop Both Eyes Nightly   • polyethylene glycol  17 g Oral Daily   • sodium chloride (PF)  2 mL Intracatheter 2 times per day   • enoxaparin  1 mg/kg Subcutaneous  LVM to schedule colonoscopy in the [facility], with the first available.    2 times per day   • Potassium Standard Replacement Protocol   Does not apply See Admin Instructions   • Magnesium Standard Replacement Protocol   Does not apply See Admin Instructions   • levothyroxine  125 mcg Oral QAM AC   • melatonin  3 mg Oral Nightly   • loratadine  10 mg Oral Daily   • pantoprazole  40 mg Oral BID AC   • metoPROLOL succinate  25 mg Oral BID   • piperacillin-tazobactam (ZOSYN) extended interval IVPB  3.375 g Intravenous 3 times per day   • nystatin   Topical 2 times per day       Continuous Infusions:    • sodium chloride 0.9 % with KCl 20 mEq infusion 75 mL/hr (03/14/21 0349)     2D ECHO ( 1/2018)  Normal left ventricular systolic function  Left ventricular ejection fraction, 65 %  Grade II/IV diastolic dysfunction  No prior study available for comparison     CT abdomen/pelvis w contrast  IMPRESSION:   1. Diverticulitis of sigmoid colon with pericolonic inflammatory changes.   No abscess or microperforation identified, no free air seen.   2. Small amount of free fluid in the pelvis. This is identified in the   cul-de-sac and right lateral pelvis. These appear to be free air is a fluid   not abscess..   3. Bilateral renal cysts.   4. Degenerative changes of spine.     Assessment and Plan:    Acute sigmoid diverticulitis, recurrent  Sepsis due to above.  No severe sepsis.  CT scan of the abdomen/pelvis with contrast on admission showing diverticulitis of sigmoid colon with pericolonic inflammatory changes.  No abscess, microperforation.  Free fluid present in the pelvis.  Clinically improving.  Abdominal pain improving  Continue IV Zosyn, IV fluids, pain control.  Surgery following-no surgical intervention recommended  Advance to full liquid diet     Paroxysmal atrial fibrillation-rapid ventricular rate  Takes Eliquis 5 mg b.i.d. PTA.  Unclear if patient is on AV john blocking agents PTA  Received 5 mg of IV followed by 25 mg of oral metoprolol in ER-heart rate improved  Continue oral  metoprolol XL 25 mg b.i.d. with hold parameters.   Lovenox 1 mg/kg b.i.d-continue  Resume Eliquis close to discharge  Continue tele monitor     Accelerated hypertension-improved  Beta-blocker management as above.  Hold triamterene/hydrochlorothiazide  P.r.n. hydralazine     Elevated BNP- be from chronic diastolic heart failure.    Chest x-ray negative for CHF.  Hold diuretic therapy.  Will resume if there is any clinical signs of CHF.  Recent 2D echo-report is pending  DC IV fluids later today     Hypokalemia-improved  Supplement and monitor     Hyponatremia- mild  Improved  Monitor     Chronic gastritis, H pylori gastritis  Completed triple therapy course  Symptomatic  Continue PPI  P.r.n. antiemetics     Hyperlipidemia-resumed statin  Hypothyroidism-resumed thyroid supplementation        DEEP VEIN THROMBOSIS PROPHYLAXIS:  SCD, Lovenox     Goals of Care:  Patient is decisional:  Yes  Patient has power of  documents in the chart:  Yes  Code Status:  No Code with [facility]  Goals of care:  Patient do not want CPR in the event of cardiopulmonary arrest.       dispo- to subacute rehab in 2-3 days  -------------------------------------------------------------------------------------------------------------------  Case discussed with:  Patient and RN    Personally Reviewed Pertinent:  Radiology and Labs    Hospital Day #:  Hospital Day: 3    Tentative discharge Disposition:  Subacute     Signed:  Prince DELFIN Muñiz MD  3/14/2021  8:57 AM

## 2024-02-20 ENCOUNTER — TELEPHONE (OUTPATIENT)
Dept: GASTROENTEROLOGY | Facility: CLINIC | Age: 64
End: 2024-02-20

## 2024-02-20 NOTE — TELEPHONE ENCOUNTER
Patient was schedule for 03/25/24, patient was confused about his jardiance medication he say that he will stop today, but I explain him that he just need to stopped 4 days prior the procedure. I mail him prep instructions and diabetics instructions.

## 2024-02-26 DIAGNOSIS — E11.8 CONTROLLED TYPE 2 DIABETES MELLITUS WITH COMPLICATION, WITHOUT LONG-TERM CURRENT USE OF INSULIN (HCC): ICD-10-CM

## 2024-03-07 ENCOUNTER — PATIENT MESSAGE (OUTPATIENT)
Dept: GASTROENTEROLOGY | Facility: CLINIC | Age: 64
End: 2024-03-07

## 2024-03-18 ENCOUNTER — OFFICE VISIT (OUTPATIENT)
Dept: FAMILY MEDICINE CLINIC | Facility: CLINIC | Age: 64
End: 2024-03-18
Payer: COMMERCIAL

## 2024-03-18 VITALS
BODY MASS INDEX: 29.6 KG/M2 | RESPIRATION RATE: 16 BRPM | SYSTOLIC BLOOD PRESSURE: 189 MMHG | WEIGHT: 184.2 LBS | TEMPERATURE: 97.4 F | DIASTOLIC BLOOD PRESSURE: 91 MMHG | OXYGEN SATURATION: 97 % | HEART RATE: 77 BPM | HEIGHT: 66 IN

## 2024-03-18 DIAGNOSIS — Z11.4 SCREENING FOR HIV (HUMAN IMMUNODEFICIENCY VIRUS): ICD-10-CM

## 2024-03-18 DIAGNOSIS — I10 PRIMARY HYPERTENSION: ICD-10-CM

## 2024-03-18 DIAGNOSIS — E66.3 OVERWEIGHT (BMI 25.0-29.9): ICD-10-CM

## 2024-03-18 DIAGNOSIS — Z12.11 SCREEN FOR COLON CANCER: ICD-10-CM

## 2024-03-18 DIAGNOSIS — Z12.5 SCREENING FOR PROSTATE CANCER: ICD-10-CM

## 2024-03-18 DIAGNOSIS — E11.8 CONTROLLED TYPE 2 DIABETES MELLITUS WITH COMPLICATION, WITHOUT LONG-TERM CURRENT USE OF INSULIN (HCC): Primary | ICD-10-CM

## 2024-03-18 DIAGNOSIS — Z23 NEED FOR VACCINATION: ICD-10-CM

## 2024-03-18 DIAGNOSIS — N18.31 STAGE 3A CHRONIC KIDNEY DISEASE (HCC): ICD-10-CM

## 2024-03-18 DIAGNOSIS — E78.5 HYPERLIPIDEMIA, UNSPECIFIED HYPERLIPIDEMIA TYPE: ICD-10-CM

## 2024-03-18 PROBLEM — E66.09 CLASS 1 OBESITY DUE TO EXCESS CALORIES WITH SERIOUS COMORBIDITY AND BODY MASS INDEX (BMI) OF 30.0 TO 30.9 IN ADULT: Status: RESOLVED | Noted: 2021-10-04 | Resolved: 2024-03-18

## 2024-03-18 PROBLEM — E66.811 CLASS 1 OBESITY DUE TO EXCESS CALORIES WITH SERIOUS COMORBIDITY AND BODY MASS INDEX (BMI) OF 30.0 TO 30.9 IN ADULT: Status: RESOLVED | Noted: 2021-10-04 | Resolved: 2024-03-18

## 2024-03-18 PROCEDURE — 99214 OFFICE O/P EST MOD 30 MIN: CPT | Performed by: FAMILY MEDICINE

## 2024-03-18 RX ORDER — LISINOPRIL 10 MG/1
10 TABLET ORAL DAILY
Qty: 90 TABLET | Refills: 3 | Status: SHIPPED | OUTPATIENT
Start: 2024-03-18

## 2024-03-18 RX ORDER — ATORVASTATIN CALCIUM 20 MG/1
20 TABLET, FILM COATED ORAL DAILY
Qty: 90 TABLET | Refills: 3 | Status: SHIPPED | OUTPATIENT
Start: 2024-03-18

## 2024-03-18 RX ORDER — GLYBURIDE 5 MG/1
5 TABLET ORAL 2 TIMES DAILY WITH MEALS
Qty: 180 TABLET | Refills: 3 | Status: SHIPPED | OUTPATIENT
Start: 2024-03-18 | End: 2024-06-16

## 2024-03-18 RX ORDER — ZOSTER VACCINE RECOMBINANT, ADJUVANTED 50 MCG/0.5
0.5 KIT INTRAMUSCULAR ONCE
Qty: 1 EACH | Refills: 1 | Status: SHIPPED | OUTPATIENT
Start: 2024-03-18 | End: 2024-03-18

## 2024-03-18 NOTE — PROGRESS NOTES
Name: Elías Bess      : 1960      MRN: [id number]  Encounter Provider: Gordon Beavers MD  Encounter Date: 3/18/2024   Encounter department: [facility]    Assessment & Plan     1. Controlled type 2 diabetes mellitus with complication, without long-term current use of insulin (HCC)  -     Hemoglobin A1C; Future; Expected date: 2024  -     Comprehensive metabolic panel; Future; Expected date: 2024  -     Albumin / creatinine urine ratio; Future; Expected date: 2024  -     Ambulatory referral to Ophthalmology; Future; Expected date: 2024  -     Empagliflozin (Jardiance) 10 MG TABS tablet; Take 1 tablet (10 mg total) by mouth daily  -     glyBURIDE (DIABETA) 5 mg tablet; Take 1 tablet (5 mg total) by mouth 2 (two) times a day with meals  -     lisinopril (ZESTRIL) 10 mg tablet; Take 1 tablet (10 mg total) by mouth daily  -     metFORMIN (GLUCOPHAGE) 1000 MG tablet; Take 1 tablet (1,000 mg total) by mouth daily with breakfast  -     US abdominal aorta screening aaa; Future; Expected date: 2024    2. Primary hypertension  -     Comprehensive metabolic panel; Future; Expected date: 2024  -     CBC and differential; Future; Expected date: 2024  -     TSH, 3rd generation with Free T4 reflex; Future; Expected date: 2024  -     UA w Reflex to Microscopic w Reflex to Culture; Future    3. Stage 3a chronic kidney disease (HCC)    4. Hyperlipidemia, unspecified hyperlipidemia type  -     Comprehensive metabolic panel; Future; Expected date: 2024  -     Lipid Panel with Direct LDL reflex; Future; Expected date: 2024  -     atorvastatin (LIPITOR) 20 mg tablet; Take 1 tablet (20 mg total) by mouth daily    5. Overweight (BMI 25.0-29.9)    6. Screen for colon cancer  -     Ambulatory Referral to Gastroenterology; Future    7. Screening for prostate cancer  -     PSA, Total Screen; Future    8. Screening for HIV (human  immunodeficiency virus)  -     HIV 1/2 AG/AB w Reflex [facility] for 2 yr old and above; Future    9. Need for vaccination  -     Zoster Vac Recomb Adjuvanted (Shingrix) 50 MCG/0.5ML SUSR; Inject 0.5 mL into a muscle once for 1 dose Repeat dose in 2 to 6 months      Regarding his diabetes patient advised to have less starches and sweets.  Diet and exercise to help him lose weight.  Refilled all his medications.  Patient sent in for AAA screen and an ophthalmologist.  Check labs above.  Regarding his hypertension and CKD 3 A patient advised not to miss his medications in the future.  Patient currently has slightly high blood pressure but has no symptoms from it cardiovascular or neurologically.  Patient also seems and is a little bit nervous.  Advised to have low-salt low condiment etc. diet.  Hydrate well.  Check labs.  Refilled his medication.  Regarding his hyperlipidemia patient advised to cut down on his fats.  Lose weight with diet and exercise.  Check his labs including his LFTs.  Refilled his med.  Regarding his overweight status patient advised to lose weight with diet and exercise.  Check labs.  Patient will be checked for prostate cancer with a PSA and HIV screen as well.  Patient be going for his colonoscopy next Monday.  Advised appropriately.  Hold Jardiance 5 days before.  And Shingrix is sent to his pharmacy.  RTO 4 months for his annual physical and do the blood work and urine before.         Subjective      64-year-old male here for evaluation of his diabetes, lipids and hypertension.  Patient has not taken his medication since February because he ran out.  Patient currently has high blood pressure but also little bit nervous and denies any cardiovascular or neuro symptoms from it.  Patient denies tobacco alcohol or drugs.  Patient will be going for colonoscopy 25th of this month.  Patient is up-to-date with his vaccines except for the shingles.      Review of Systems   Constitutional:  Negative for  "activity change, appetite change, chills, fatigue, fever and unexpected weight change.   HENT:  Negative for congestion and sore throat.    Eyes:  Negative for visual disturbance.   Respiratory:  Negative for cough and shortness of breath.    Cardiovascular:  Negative for chest pain and palpitations.   Gastrointestinal:  Negative for abdominal pain, blood in stool, constipation, diarrhea, nausea and vomiting.   Genitourinary:  Negative for dysuria and hematuria.   Musculoskeletal:  Negative for neck pain.   Skin:  Negative for rash.   Neurological:  Negative for dizziness and headaches.   Psychiatric/Behavioral:  Negative for dysphoric mood. The patient is not nervous/anxious.        Current Outpatient Medications on File Prior to Visit   Medication Sig   • betamethasone valerate (VALISONE) 0.1 % cream Apply topically 2 (two) times a day   • glucose blood (TRUE METRIX BLOOD GLUCOSE TEST) test strip Check twice daily (Patient not taking: Reported on 1/10/2022)   • [DISCONTINUED] atorvastatin (LIPITOR) 20 mg tablet Take 1 tablet (20 mg total) by mouth daily   • [DISCONTINUED] Empagliflozin (Jardiance) 10 MG TABS tablet Take 1 tablet (10 mg total) by mouth daily   • [DISCONTINUED] glyBURIDE (DIABETA) 5 mg tablet Take 1 tablet (5 mg total) by mouth 2 (two) times a day with meals   • [DISCONTINUED] lisinopril (ZESTRIL) 10 mg tablet Take 1 tablet (10 mg total) by mouth daily   • [DISCONTINUED] metFORMIN (GLUCOPHAGE) 1000 MG tablet Take 1 tablet (1,000 mg total) by mouth daily with breakfast       Objective     BP (!) 189/91 (BP Location: Left arm, Patient Position: Sitting, Cuff Size: Adult)   Pulse 77   Temp (!) 97.4 °F (36.3 °C) (Tympanic)   Resp 16   Ht 5' 6\" (1.676 m)   Wt 83.6 kg (184 lb 3.2 oz)   SpO2 97%   BMI 29.73 kg/m²     Physical Exam  Vitals reviewed.   Constitutional:       General: He is not in acute distress.     Appearance: Normal appearance. He is not ill-appearing.   HENT:      Head: " Normocephalic and atraumatic.      Mouth/Throat:      Mouth: Mucous membranes are moist.      Pharynx: Oropharynx is clear.   Eyes:      Extraocular Movements: Extraocular movements intact.      Conjunctiva/sclera: Conjunctivae normal.   Neck:      Vascular: No carotid bruit.   Cardiovascular:      Rate and Rhythm: Normal rate and regular rhythm.      Pulses: no weak pulses.           Dorsalis pedis pulses are 2+ on the right side and 2+ on the left side.        Posterior tibial pulses are 2+ on the right side and 2+ on the left side.   Pulmonary:      Effort: Pulmonary effort is normal.   Abdominal:      General: Bowel sounds are normal.      Palpations: Abdomen is soft.      Tenderness: There is no abdominal tenderness. There is no right CVA tenderness or left CVA tenderness.   Musculoskeletal:         General: No tenderness.      Cervical back: Neck supple.      Right lower leg: No edema.      Left lower leg: No edema.      Comments: No calf tenderness bilateral.   Feet:      Right foot:      Skin integrity: No ulcer, skin breakdown, erythema, warmth, callus or dry skin.      Left foot:      Skin integrity: No ulcer, skin breakdown, erythema, warmth, callus or dry skin.   Lymphadenopathy:      Cervical: No cervical adenopathy.   Skin:     General: Skin is warm.      Findings: No rash.   Neurological:      General: No focal deficit present.      Mental Status: He is alert and oriented to person, place, and time.   Psychiatric:         Mood and Affect: Mood normal.         Behavior: Behavior normal.         Thought Content: Thought content normal.         Diabetic Foot Exam    Patient's shoes and socks removed.    Right Foot/Ankle   Right Foot Inspection  Skin Exam: skin normal and skin intact. No dry skin, no warmth, no callus, no erythema, no maceration, no abnormal color, no pre-ulcer, no ulcer and no callus.     Toe Exam: ROM and strength within normal limits. No swelling, no tenderness, erythema and  no right  toe deformity    Sensory   Monofilament testing: intact    Vascular  Capillary refills: < 3 seconds  The right DP pulse is 2+. The right PT pulse is 2+.     Left Foot/Ankle  Left Foot Inspection  Skin Exam: skin normal and skin intact. No dry skin, no warmth, no erythema, no maceration, normal color, no pre-ulcer, no ulcer and no callus.     Toe Exam: ROM and strength within normal limits. No swelling, no tenderness, no erythema and no left toe deformity.     Sensory   Monofilament testing: intact    Vascular  Capillary refills: < 3 seconds  The left DP pulse is 2+. The left PT pulse is 2+.     Assign Risk Category  No deformity present  No loss of protective sensation  No weak pulses  Risk: 0      Gordon Beavers MD

## 2024-03-21 ENCOUNTER — TELEPHONE (OUTPATIENT)
Dept: GASTROENTEROLOGY | Facility: HOSPITAL | Age: 64
End: 2024-03-21

## 2024-03-25 ENCOUNTER — HOSPITAL ENCOUNTER (OUTPATIENT)
Dept: GASTROENTEROLOGY | Facility: HOSPITAL | Age: 64
Setting detail: OUTPATIENT SURGERY
Discharge: HOME | End: 2024-03-25
Attending: INTERNAL MEDICINE
Payer: COMMERCIAL

## 2024-03-25 ENCOUNTER — ANESTHESIA (OUTPATIENT)
Dept: GASTROENTEROLOGY | Facility: HOSPITAL | Age: 64
End: 2024-03-25

## 2024-03-25 ENCOUNTER — ANESTHESIA EVENT (OUTPATIENT)
Dept: GASTROENTEROLOGY | Facility: HOSPITAL | Age: 64
End: 2024-03-25

## 2024-03-25 VITALS
DIASTOLIC BLOOD PRESSURE: 67 MMHG | HEART RATE: 83 BPM | TEMPERATURE: 98.2 F | SYSTOLIC BLOOD PRESSURE: 113 MMHG | OXYGEN SATURATION: 98 % | RESPIRATION RATE: 12 BRPM

## 2024-03-25 DIAGNOSIS — K63.5 POLYP OF COLON, UNSPECIFIED PART OF COLON, UNSPECIFIED TYPE: ICD-10-CM

## 2024-03-25 LAB — GLUCOSE SERPL-MCNC: 146 MG/DL (ref 65–140)

## 2024-03-25 PROCEDURE — 88342 IMHCHEM/IMCYTCHM 1ST ANTB: CPT | Performed by: PATHOLOGY

## 2024-03-25 PROCEDURE — 88305 TISSUE EXAM BY PATHOLOGIST: CPT | Performed by: PATHOLOGY

## 2024-03-25 PROCEDURE — 82948 REAGENT STRIP/BLOOD GLUCOSE: CPT

## 2024-03-25 PROCEDURE — 88341 IMHCHEM/IMCYTCHM EA ADD ANTB: CPT | Performed by: PATHOLOGY

## 2024-03-25 RX ORDER — LIDOCAINE HYDROCHLORIDE 10 MG/ML
INJECTION, SOLUTION EPIDURAL; INFILTRATION; INTRACAUDAL; PERINEURAL AS NEEDED
Status: DISCONTINUED | OUTPATIENT
Start: 2024-03-25 | End: 2024-03-25

## 2024-03-25 RX ORDER — SODIUM CHLORIDE, SODIUM LACTATE, POTASSIUM CHLORIDE, CALCIUM CHLORIDE 600; 310; 30; 20 MG/100ML; MG/100ML; MG/100ML; MG/100ML
125 INJECTION, SOLUTION INTRAVENOUS CONTINUOUS
Status: DISCONTINUED | OUTPATIENT
Start: 2024-03-25 | End: 2024-03-29 | Stop reason: HOSPADM

## 2024-03-25 RX ORDER — PROPOFOL 10 MG/ML
INJECTION, EMULSION INTRAVENOUS AS NEEDED
Status: DISCONTINUED | OUTPATIENT
Start: 2024-03-25 | End: 2024-03-25

## 2024-03-25 RX ORDER — SODIUM CHLORIDE, SODIUM LACTATE, POTASSIUM CHLORIDE, CALCIUM CHLORIDE 600; 310; 30; 20 MG/100ML; MG/100ML; MG/100ML; MG/100ML
100 INJECTION, SOLUTION INTRAVENOUS CONTINUOUS
Status: CANCELLED | OUTPATIENT
Start: 2024-03-25

## 2024-03-25 RX ORDER — SODIUM CHLORIDE, SODIUM LACTATE, POTASSIUM CHLORIDE, CALCIUM CHLORIDE 600; 310; 30; 20 MG/100ML; MG/100ML; MG/100ML; MG/100ML
INJECTION, SOLUTION INTRAVENOUS CONTINUOUS PRN
Status: DISCONTINUED | OUTPATIENT
Start: 2024-03-25 | End: 2024-03-25

## 2024-03-25 RX ADMIN — SODIUM CHLORIDE, SODIUM LACTATE, POTASSIUM CHLORIDE, AND CALCIUM CHLORIDE 125 ML/HR: .6; .31; .03; .02 INJECTION, SOLUTION INTRAVENOUS at 08:19

## 2024-03-25 RX ADMIN — LIDOCAINE HYDROCHLORIDE 50 MG: 10 INJECTION, SOLUTION EPIDURAL; INFILTRATION; INTRACAUDAL; PERINEURAL at 08:52

## 2024-03-25 RX ADMIN — SIMETHICONE 40 MG: 20 EMULSION ORAL at 09:00

## 2024-03-25 RX ADMIN — PROPOFOL 50 MG: 10 INJECTION, EMULSION INTRAVENOUS at 09:02

## 2024-03-25 RX ADMIN — PROPOFOL 100 MG: 10 INJECTION, EMULSION INTRAVENOUS at 08:54

## 2024-03-25 RX ADMIN — SODIUM CHLORIDE, SODIUM LACTATE, POTASSIUM CHLORIDE, AND CALCIUM CHLORIDE: .6; .31; .03; .02 INJECTION, SOLUTION INTRAVENOUS at 08:18

## 2024-03-25 RX ADMIN — PROPOFOL 50 MG: 10 INJECTION, EMULSION INTRAVENOUS at 09:07

## 2024-03-25 RX ADMIN — PROPOFOL 50 MG: 10 INJECTION, EMULSION INTRAVENOUS at 09:13

## 2024-03-25 RX ADMIN — PROPOFOL 100 MG: 10 INJECTION, EMULSION INTRAVENOUS at 08:58

## 2024-03-25 NOTE — H&P
History and Physical - [facility]  Elías Bess 64 y.o. male MRN: [id number]                  HPI: Elías Bess is a 64 y.o. year old male who presents for colonoscopy due to history of polyps.       REVIEW OF SYSTEMS: Per the HPI, and otherwise unremarkable.    Historical Information   Past Medical History:   Diagnosis Date    Diabetes mellitus (HCC)     Epigastric abdominal pain     Epigastric pain     GERD (gastroesophageal reflux disease)     Hypertension      Past Surgical History:   Procedure Laterality Date    EGD AND COLONOSCOPY N/A 3/19/2018    Procedure: EGD AND COLONOSCOPY;  Surgeon: Will Ricci MD;  Location: [facility];  Service: Gastroenterology    NO PAST SURGERIES       Social History   Social History     Substance and Sexual Activity   Alcohol Use No    Comment: social alcohol use     Social History     Substance and Sexual Activity   Drug Use No     Social History     Tobacco Use   Smoking Status Never   Smokeless Tobacco Never     Family History   Problem Relation Age of Onset    Cancer Father        Meds/Allergies       Current Outpatient Medications:     atorvastatin (LIPITOR) 20 mg tablet    Empagliflozin (Jardiance) 10 MG TABS tablet    glyBURIDE (DIABETA) 5 mg tablet    lisinopril (ZESTRIL) 10 mg tablet    betamethasone valerate (VALISONE) 0.1 % cream    glucose blood (TRUE METRIX BLOOD GLUCOSE TEST) test strip    metFORMIN (GLUCOPHAGE) 1000 MG tablet    Current Facility-Administered Medications:     lactated ringers infusion, 125 mL/hr, Intravenous, Continuous, 125 mL/hr at 03/25/24 0819    Facility-Administered Medications Ordered in Other Encounters:     lactated ringers infusion, , Intravenous, Continuous PRN, New Bag at 03/25/24 0818    No Known Allergies    Objective     BP (!) 171/92   Pulse 101   Temp 98.2 °F (36.8 °C) (Temporal)   Resp 18   SpO2 98%       PHYSICAL EXAM    Gen: NAD  Head: NCAT  CV: RRR  CHEST: Clear  ABD: soft, NT/ND  EXT:  no edema      ASSESSMENT/PLAN:  This is a 64 y.o. year old male here for colonoscopy, and he is stable and optimized for his procedure.

## 2024-03-25 NOTE — ANESTHESIA PREPROCEDURE EVALUATION
PMedical History    History Comments   Epigastric abdominal pain    Diabetes mellitus (HCC)    Epigastric pain    GERD (gastroesophageal reflux disease)    Hypertension    rocedure:  COLONOSCOPY    Relevant Problems   ANESTHESIA (within normal limits)      CARDIO   (+) HTN (hypertension)   (+) Hyperlipidemia      ENDO   (+) Controlled type 2 diabetes mellitus with complication, without long-term current use of insulin (HCC)      GI/HEPATIC   (+) Gastroesophageal reflux disease      /RENAL   (+) Stage 3a chronic kidney disease (HCC)        Physical Exam    Airway    Mallampati score: II  TM Distance: >3 FB  Neck ROM: full     Dental       Cardiovascular  Rate: normal    Pulmonary  Pulmonary exam normal     Other Findings  Per pt denies anything remaining that is loose or removeable      Anesthesia Plan  ASA Score- 3     Anesthesia Type- IV sedation with anesthesia with ASA Monitors.         Additional Monitors:     Airway Plan:     Comment: Per patient, appropriately NPO, denies active CP/SOB/wheezing/symptoms related to heartburn/nausea/vomiting    .       Plan Factors-Exercise tolerance (METS): >4 METS.    Chart reviewed.    Patient summary reviewed.    Patient is not a current smoker.              Induction- intravenous.    Postoperative Plan-     Informed Consent- Anesthetic plan and risks discussed with patient.  I personally reviewed this patient with the CRNA. Discussed and agreed on the Anesthesia Plan with the CRNA..

## 2024-07-29 LAB
ALBUMIN SERPL-MCNC: 5 G/DL (ref 3.6–5.1)
ALBUMIN/CREAT UR: 10 MG/G CREAT
ALBUMIN/GLOB SERPL: 2 (CALC) (ref 1–2.5)
ALP SERPL-CCNC: 75 U/L (ref 35–144)
ALT SERPL-CCNC: 63 U/L (ref 9–46)
APPEARANCE UR: CLEAR
AST SERPL-CCNC: 34 U/L (ref 10–35)
BACTERIA UR QL AUTO: ABNORMAL /HPF
BASOPHILS # BLD AUTO: 51 CELLS/UL (ref 0–200)
BASOPHILS NFR BLD AUTO: 0.6 %
BILIRUB SERPL-MCNC: 0.8 MG/DL (ref 0.2–1.2)
BILIRUB UR QL STRIP: NEGATIVE
BUN SERPL-MCNC: 28 MG/DL (ref 7–25)
BUN/CREAT SERPL: 23 (CALC) (ref 6–22)
CALCIUM SERPL-MCNC: 10.2 MG/DL (ref 8.6–10.3)
CHLORIDE SERPL-SCNC: 102 MMOL/L (ref 98–110)
CHOLEST SERPL-MCNC: 146 MG/DL
CHOLEST/HDLC SERPL: 3.2 (CALC)
CO2 SERPL-SCNC: 26 MMOL/L (ref 20–32)
COLOR UR: YELLOW
CREAT SERPL-MCNC: 1.2 MG/DL (ref 0.7–1.35)
CREAT UR-MCNC: 48 MG/DL (ref 20–320)
EOSINOPHIL # BLD AUTO: 272 CELLS/UL (ref 15–500)
EOSINOPHIL NFR BLD AUTO: 3.2 %
ERYTHROCYTE [DISTWIDTH] IN BLOOD BY AUTOMATED COUNT: 13.8 % (ref 11–15)
GFR/BSA.PRED SERPLBLD CYS-BASED-ARV: 68 ML/MIN/1.73M2
GLOBULIN SER CALC-MCNC: 2.5 G/DL (CALC) (ref 1.9–3.7)
GLUCOSE SERPL-MCNC: 176 MG/DL (ref 65–99)
GLUCOSE UR QL STRIP: ABNORMAL
HBA1C MFR BLD: 9.2 % OF TOTAL HGB
HCT VFR BLD AUTO: 51.8 % (ref 38.5–50)
HDLC SERPL-MCNC: 46 MG/DL
HGB BLD-MCNC: 17.6 G/DL (ref 13.2–17.1)
HGB UR QL STRIP: NEGATIVE
HIV 1+2 AB+HIV1 P24 AG SERPL QL IA: NORMAL
HYALINE CASTS #/AREA URNS LPF: ABNORMAL /LPF
KETONES UR QL STRIP: NEGATIVE
LDLC SERPL CALC-MCNC: 75 MG/DL (CALC)
LEUKOCYTE ESTERASE UR QL STRIP: NEGATIVE
LYMPHOCYTES # BLD AUTO: 2771 CELLS/UL (ref 850–3900)
LYMPHOCYTES NFR BLD AUTO: 32.6 %
MCH RBC QN AUTO: 30.2 PG (ref 27–33)
MCHC RBC AUTO-ENTMCNC: 34 G/DL (ref 32–36)
MCV RBC AUTO: 88.9 FL (ref 80–100)
MICROALBUMIN UR-MCNC: 0.5 MG/DL
MONOCYTES # BLD AUTO: 510 CELLS/UL (ref 200–950)
MONOCYTES NFR BLD AUTO: 6 %
NEUTROPHILS # BLD AUTO: 4896 CELLS/UL (ref 1500–7800)
NEUTROPHILS NFR BLD AUTO: 57.6 %
NITRITE UR QL STRIP: NEGATIVE
NONHDLC SERPL-MCNC: 100 MG/DL (CALC)
PH UR STRIP: 6 [PH] (ref 5–8)
PLATELET # BLD AUTO: 152 THOUSAND/UL (ref 140–400)
PMV BLD REES-ECKER: 10.3 FL (ref 7.5–12.5)
POTASSIUM SERPL-SCNC: 4.2 MMOL/L (ref 3.5–5.3)
PROT SERPL-MCNC: 7.5 G/DL (ref 6.1–8.1)
PROT UR QL STRIP: NEGATIVE
PSA SERPL-MCNC: 0.94 NG/ML
RBC # BLD AUTO: 5.83 MILLION/UL (ref 4.2–5.8)
RBC #/AREA URNS HPF: ABNORMAL /HPF
SODIUM SERPL-SCNC: 139 MMOL/L (ref 135–146)
SP GR UR STRIP: 1.02 (ref 1–1.03)
SQUAMOUS #/AREA URNS HPF: ABNORMAL /HPF
T4 FREE SERPL-MCNC: 1.1 NG/DL (ref 0.8–1.8)
TRIGL SERPL-MCNC: 146 MG/DL
TSH SERPL-ACNC: 8.22 MIU/L (ref 0.4–4.5)
WBC # BLD AUTO: 8.5 THOUSAND/UL (ref 3.8–10.8)
WBC #/AREA URNS HPF: ABNORMAL /HPF

## 2024-07-31 ENCOUNTER — TELEPHONE (OUTPATIENT)
Dept: INTERNAL MEDICINE CLINIC | Facility: CLINIC | Age: 64
End: 2024-07-31

## 2024-07-31 NOTE — TELEPHONE ENCOUNTER
----- Message from Gordon Beavers MD sent at 7/30/2024  2:02 PM EDT -----  Please call the patient regarding his abnormal result.  Pt's dm2 is worse. Needs to do a better diet; less starches/sweets/fats. Will see him on 8 19 24, and will discuss his abn thyroid test also. ty

## 2024-08-19 ENCOUNTER — OFFICE VISIT (OUTPATIENT)
Dept: FAMILY MEDICINE CLINIC | Facility: CLINIC | Age: 64
End: 2024-08-19
Payer: COMMERCIAL

## 2024-08-19 VITALS
HEIGHT: 66 IN | OXYGEN SATURATION: 97 % | HEART RATE: 79 BPM | RESPIRATION RATE: 15 BRPM | DIASTOLIC BLOOD PRESSURE: 75 MMHG | BODY MASS INDEX: 28.28 KG/M2 | WEIGHT: 176 LBS | TEMPERATURE: 98.2 F | SYSTOLIC BLOOD PRESSURE: 130 MMHG

## 2024-08-19 DIAGNOSIS — E66.3 OVERWEIGHT (BMI 25.0-29.9): ICD-10-CM

## 2024-08-19 DIAGNOSIS — R79.89 ELEVATED TSH: ICD-10-CM

## 2024-08-19 DIAGNOSIS — N18.2 CKD (CHRONIC KIDNEY DISEASE) STAGE 2, GFR 60-89 ML/MIN: ICD-10-CM

## 2024-08-19 DIAGNOSIS — R79.89 LFT ELEVATION: ICD-10-CM

## 2024-08-19 DIAGNOSIS — E78.2 MIXED HYPERLIPIDEMIA: ICD-10-CM

## 2024-08-19 DIAGNOSIS — E11.8 CONTROLLED TYPE 2 DIABETES MELLITUS WITH COMPLICATION, WITHOUT LONG-TERM CURRENT USE OF INSULIN (HCC): Primary | ICD-10-CM

## 2024-08-19 DIAGNOSIS — I10 PRIMARY HYPERTENSION: ICD-10-CM

## 2024-08-19 PROCEDURE — 99214 OFFICE O/P EST MOD 30 MIN: CPT | Performed by: FAMILY MEDICINE

## 2024-08-19 NOTE — PROGRESS NOTES
Ambulatory Visit  Name: Elías Bess      : 1960      MRN: [id number]  Encounter Provider: Gordon Beavers MD  Encounter Date: 2024   Encounter department: [facility]    Assessment & Plan   1. Controlled type 2 diabetes mellitus with complication, without long-term current use of insulin (HCC)  -     metFORMIN (GLUCOPHAGE) 1000 MG tablet; Take 1 tablet (1,000 mg total) by mouth 2 (two) times a day with meals  2. Primary hypertension  3. CKD (chronic kidney disease) stage 2, GFR 60-89 ml/min  4. Mixed hyperlipidemia  5. LFT elevation  -     US abdomen complete; Future; Expected date: 2024  6. Elevated TSH  -     TSH + Free T4; Future  -     US thyroid; Future; Expected date: 2024  7. Overweight (BMI 25.0-29.9)        Regarding his diabetes his glucose was 176 down from 137 from October last year and his A1c went up to 9.2 from 7.4 in July last year.  Of note it was 9.5 in January of last year.  Patient is not doing an appropriate diet though he is on 3 meds for his diabetes as above.  He brought in all his meds today with him and I reviewed them with him as well.  I told him that I will be increasing his metformin to twice a day.  He also needs to recheck his TFTs and see if that needs to be corrected as well.  My plan is to repeat the blood work again in 3 months.  Patient is advised, and in Chinese by me, to have less starch and sweets and fats.  Exercise as well.  Daily foot exams.  And I will follow-up closely.  To consider referral to an endocrinologist as well if not better.  Patient also advised to do the abdominal aortic Doppler.  Regarding his hypertension and CKD 2, GFR of 68, discussed with patient.  Patient advised to have less salt.  Hydrate more/well.  Continue his current medication.  Will continue to monitor as well.  Regarding his hyperlipidemia patient's LFTs are normal with exception of ALT to be 63.  Patient has no symptoms from it.   Patient total cholesterol went up to 146 from 120 from October of last year, HDL went up to 46-45, triglycerides went up to 146 from 79 and his LDL went up to 75 from 59.  Patient advised to cut down on his fats starches and sweets.  Lose weight with better diet and exercise.  Continue statin for now because he has no symptoms from it and I will follow his LFTs closely as well.  I will recheck his labs again in 3 months.  Regarding his slight LFT elevation with an ALT to be 63, discussed with patient.  Patient has no symptoms.  Patient will get an abdominal sonogram also that he needs for other reasons such as a renal sonogram for his CKD as well.  Patient has no symptoms.  Will continue to monitor.  Regarding his elevated TSH of 8.22, discussed with patient.  Patient explained the role of thyroid etc.  Patient will recheck TFTs and will also get a thyroid sonogram.  And if patient needs to go on levothyroxine he will have to.  Regarding his overweight status with a BMI of 28.4, discussed with patient.  Patient advised to lose weight with a better diet and exercise.  Will continue to monitor.  Of note patient's PSA went down to 0.94 when it was 1.47 back in October last year.  Patient has no acute symptoms.  RTO 6 weeks for his annual physical and do the blood work and the sonograms before.            Depression Screening and Follow-up Plan: Patient was screened for depression during today's encounter. They screened negative with a PHQ-2 score of 0.      History of Present Illness     64-year-old male here for an evaluation of his diabetes lipids and hypertension.  Patient did blood work and urine at the end of last month.  Patient has an order for a renal bladder sonogram from a while back but he has not done he also has an order for an abdominal aortic Doppler that he has not done yet.  Patient had a colonoscopy in February of this year.  And needs repeat another 1 in 7 years as per the report.  Patient denies  "tobacco alcohol or drugs.        Review of Systems   Constitutional:  Negative for chills, fatigue, fever and unexpected weight change.   HENT:  Negative for congestion and sore throat.    Eyes:  Negative for visual disturbance.   Respiratory:  Negative for cough and shortness of breath.    Cardiovascular:  Negative for chest pain and palpitations.   Gastrointestinal:  Negative for abdominal pain, blood in stool, constipation, diarrhea, nausea and vomiting.   Genitourinary:  Negative for dysuria and hematuria.   Musculoskeletal:  Negative for arthralgias.   Skin:  Negative for rash.   Neurological:  Negative for dizziness and headaches.   Psychiatric/Behavioral:  Negative for dysphoric mood. The patient is not nervous/anxious.        Objective     /75   Pulse 79   Temp 98.2 °F (36.8 °C)   Resp 15   Ht 5' 6\" (1.676 m)   Wt 79.8 kg (176 lb)   SpO2 97%   BMI 28.41 kg/m²     Physical Exam  Vitals reviewed.   Constitutional:       General: He is not in acute distress.     Appearance: Normal appearance. He is not ill-appearing.   HENT:      Head: Normocephalic and atraumatic.      Mouth/Throat:      Mouth: Mucous membranes are moist.   Eyes:      Extraocular Movements: Extraocular movements intact.      Conjunctiva/sclera: Conjunctivae normal.   Neck:      Vascular: No carotid bruit.   Cardiovascular:      Rate and Rhythm: Normal rate and regular rhythm.   Pulmonary:      Effort: Pulmonary effort is normal.      Breath sounds: Normal breath sounds.   Abdominal:      General: Bowel sounds are normal.      Palpations: Abdomen is soft.      Tenderness: There is no abdominal tenderness. There is no right CVA tenderness or left CVA tenderness.   Musculoskeletal:         General: No tenderness.      Right lower leg: No edema.      Left lower leg: No edema.      Comments: No calf tenderness bilateral.   Lymphadenopathy:      Cervical: No cervical adenopathy.   Skin:     General: Skin is warm.   Neurological:      " General: No focal deficit present.      Mental Status: He is alert and oriented to person, place, and time.   Psychiatric:         Mood and Affect: Mood normal.         Behavior: Behavior normal.         Thought Content: Thought content normal.       Administrative Statements

## 2025-01-27 ENCOUNTER — OFFICE VISIT (OUTPATIENT)
Dept: FAMILY MEDICINE CLINIC | Facility: CLINIC | Age: 65
End: 2025-01-27
Payer: COMMERCIAL

## 2025-01-27 VITALS
DIASTOLIC BLOOD PRESSURE: 90 MMHG | WEIGHT: 176 LBS | OXYGEN SATURATION: 98 % | HEART RATE: 70 BPM | SYSTOLIC BLOOD PRESSURE: 160 MMHG | BODY MASS INDEX: 28.41 KG/M2 | TEMPERATURE: 98 F

## 2025-01-27 DIAGNOSIS — E11.8 CONTROLLED TYPE 2 DIABETES MELLITUS WITH COMPLICATION, WITHOUT LONG-TERM CURRENT USE OF INSULIN (HCC): ICD-10-CM

## 2025-01-27 DIAGNOSIS — Z23 NEED FOR VACCINATION: ICD-10-CM

## 2025-01-27 DIAGNOSIS — I10 PRIMARY HYPERTENSION: ICD-10-CM

## 2025-01-27 DIAGNOSIS — Z00.00 ANNUAL PHYSICAL EXAM: Primary | ICD-10-CM

## 2025-01-27 DIAGNOSIS — Z12.5 SCREENING FOR PROSTATE CANCER: ICD-10-CM

## 2025-01-27 DIAGNOSIS — E66.3 OVERWEIGHT (BMI 25.0-29.9): ICD-10-CM

## 2025-01-27 DIAGNOSIS — E78.2 MIXED HYPERLIPIDEMIA: ICD-10-CM

## 2025-01-27 DIAGNOSIS — N18.2 CKD (CHRONIC KIDNEY DISEASE) STAGE 2, GFR 60-89 ML/MIN: ICD-10-CM

## 2025-01-27 DIAGNOSIS — L30.9 ECZEMA, UNSPECIFIED TYPE: ICD-10-CM

## 2025-01-27 PROCEDURE — 99396 PREV VISIT EST AGE 40-64: CPT | Performed by: FAMILY MEDICINE

## 2025-01-27 RX ORDER — ZOSTER VACCINE RECOMBINANT, ADJUVANTED 50 MCG/0.5
0.5 KIT INTRAMUSCULAR ONCE
Qty: 1 EACH | Refills: 1 | Status: SHIPPED | OUTPATIENT
Start: 2025-01-27 | End: 2025-01-27

## 2025-01-27 RX ORDER — LISINOPRIL 10 MG/1
10 TABLET ORAL DAILY
Qty: 90 TABLET | Refills: 3 | Status: SHIPPED | OUTPATIENT
Start: 2025-01-27

## 2025-01-27 RX ORDER — BETAMETHASONE VALERATE 1.2 MG/G
CREAM TOPICAL 2 TIMES DAILY
Qty: 90 G | Refills: 3 | Status: SHIPPED | OUTPATIENT
Start: 2025-01-27

## 2025-01-27 NOTE — ASSESSMENT & PLAN NOTE
Controlled.  Patient to have less fats starches and sweets.  Diet and exercise.  Continue statin.  Check labs now.  Orders:  •  Lipid Panel with Direct LDL reflex; Future  •  Comprehensive metabolic panel; Future

## 2025-01-27 NOTE — ASSESSMENT & PLAN NOTE
Advised patient to lose weight with a better diet and start exercising.  Check his labs above.  Continue to monitor and follow-up.

## 2025-01-27 NOTE — ASSESSMENT & PLAN NOTE
Regarding his annual physical patient is given age and diagnosis appropriate evaluation and care.  Patient's ordered the blood work and urine below.  Which also includes a screening test for prostate cancer.  Patient does have nocturia as well.  And he is readvised to do the renal bladder sonogram with PVR.  Patient sent the Shingrix vaccine to his pharmacy.  He declined the flu vaccine.  Patient advised to take multivitamin.  Vitamin D3 2000 units daily.  Continue checking his skin regularly as well as sunscreen.  Diet and regular exercise.  Orders:  •  Lipid Panel with Direct LDL reflex; Future  •  CBC and differential; Future  •  Comprehensive metabolic panel; Future  •  TSH, 3rd generation with Free T4 reflex; Future  •  UA w Reflex to Microscopic w Reflex to Culture; Future

## 2025-01-27 NOTE — ASSESSMENT & PLAN NOTE
Lab Results   Component Value Date    HGBA1C 9.2 (H) 07/29/2024   Not controlled according to his last A1c in July.  Patient is ordered more blood work today.  He asserts that his numbers are much better.  His ACE inhibitor was also refilled today.  Patient is sent to the ophthalmologist.  Patient advised to do daily self foot exam.  Cut down on starches sweets and fats.  Continue his current therapy.  See me again in 4 weeks to discuss in person also.  Orders:  •  lisinopril (ZESTRIL) 10 mg tablet; Take 1 tablet (10 mg total) by mouth daily  •  Hemoglobin A1C; Future  •  Albumin / creatinine urine ratio; Future  •  Comprehensive metabolic panel; Future  •  TSH, 3rd generation with Free T4 reflex; Future  •  Ambulatory referral to Ophthalmology; Future

## 2025-01-27 NOTE — PATIENT INSTRUCTIONS
"Patient Education     Routine physical for adults   The Basics   Written by the doctors and editors at [facility]   What is a physical? -- A physical is a routine visit, or \"check-up,\" with your doctor. You might also hear it called a \"wellness visit\" or \"preventive visit.\"  During each visit, the doctor will:   Ask about your physical and mental health   Ask about your habits, behaviors, and lifestyle   Do an exam   Give you vaccines if needed   Talk to you about any medicines you take   Give advice about your health   Answer your questions  Getting regular check-ups is an important part of taking care of your health. It can help your doctor find and treat any problems you have. But it's also important for preventing health problems.  A routine physical is different from a \"sick visit.\" A sick visit is when you see a doctor because of a health concern or problem. Since physicals are scheduled ahead of time, you can think about what you want to ask the doctor.  How often should I get a physical? -- It depends on your age and health. In general, for people age 21 years and older:   If you are younger than 50 years, you might be able to get a physical every 3 years.   If you are 50 years or older, your doctor might recommend a physical every year.  If you have an ongoing health condition, like diabetes or high blood pressure, your doctor will probably want to see you more often.  What happens during a physical? -- In general, each visit will include:   Physical exam - The doctor or nurse will check your height, weight, heart rate, and blood pressure. They will also look at your eyes and ears. They will ask about how you are feeling and whether you have any symptoms that bother you.   Medicines - It's a good idea to bring a list of all the medicines you take to each doctor visit. Your doctor will talk to you about your medicines and answer any questions. Tell them if you are having any side effects that bother you. You " "should also tell them if you are having trouble paying for any of your medicines.   Habits and behaviors - This includes:   Your diet   Your exercise habits   Whether you smoke, drink alcohol, or use drugs   Whether you are sexually active   Whether you feel safe at home  Your doctor will talk to you about things you can do to improve your health and lower your risk of health problems. They will also offer help and support. For example, if you want to quit smoking, they can give you advice and might prescribe medicines. If you want to improve your diet or get more physical activity, they can help you with this, too.   Lab tests, if needed - The tests you get will depend on your age and situation. For example, your doctor might want to check your:   Cholesterol   Blood sugar   Iron level   Vaccines - The recommended vaccines will depend on your age, health, and what vaccines you already had. Vaccines are very important because they can prevent certain serious or deadly infections.   Discussion of screening - \"Screening\" means checking for diseases or other health problems before they cause symptoms. Your doctor can recommend screening based on your age, risk, and preferences. This might include tests to check for:   Cancer, such as breast, prostate, cervical, ovarian, colorectal, prostate, lung, or skin cancer   Sexually transmitted infections, such as chlamydia and gonorrhea   Mental health conditions like depression and anxiety  Your doctor will talk to you about the different types of screening tests. They can help you decide which screenings to have. They can also explain what the results might mean.   Answering questions - The physical is a good time to ask the doctor or nurse questions about your health. If needed, they can refer you to other doctors or specialists, too.  Adults older than 65 years often need other care, too. As you get older, your doctor will talk to you about:   How to prevent falling at " home   Hearing or vision tests   Memory testing   How to take your medicines safely   Making sure that you have the help and support you need at home  All topics are updated as new evidence becomes available and our peer review process is complete.  This topic retrieved from [employer] on: May 02, 2024.  Topic 361231 Version 1.0  Release: 32.4.3 - C32.122  © 2024 UpToDate, Inc. and/or its affiliates. All rights reserved.  Consumer Information Use and Disclaimer   Disclaimer: This generalized information is a limited summary of diagnosis, treatment, and/or medication information. It is not meant to be comprehensive and should be used as a tool to help the user understand and/or assess potential diagnostic and treatment options. It does NOT include all information about conditions, treatments, medications, side effects, or risks that may apply to a specific patient. It is not intended to be medical advice or a substitute for the medical advice, diagnosis, or treatment of a health care provider based on the health care provider's examination and assessment of a patient's specific and unique circumstances. Patients must speak with a health care provider for complete information about their health, medical questions, and treatment options, including any risks or benefits regarding use of medications. This information does not endorse any treatments or medications as safe, effective, or approved for treating a specific patient. UpToDate, Inc. and its affiliates disclaim any warranty or liability relating to this information or the use thereof.The use of this information is governed by the Terms of Use, available at https://www.[employer] [employer].com/en/know/clinical-effectiveness-terms. 2024© UpToDate, Inc. and its affiliates and/or licensors. All rights reserved.  Copyright   © 2024 UpToDate, Inc. and/or its affiliates. All rights reserved.

## 2025-01-27 NOTE — ASSESSMENT & PLAN NOTE
Lab Results   Component Value Date    EGFR 68 07/29/2024    EGFR 56 (L) 10/16/2023    EGFR 73 12/18/2017    CREATININE 1.20 07/29/2024    CREATININE 1.41 (H) 10/16/2023    CREATININE 1.22 09/20/2021   Stable for now.  Recheck labs now.  Advised patient diabetes control as well as hypertension control.  Hydrate well.  Low-sodium diet.  Orders:  •  Comprehensive metabolic panel; Future

## 2025-01-27 NOTE — PROGRESS NOTES
Adult Annual Physical  Name: Elías Bess      : 1960      MRN: [id number]  Encounter Provider: Gordon Beavers MD  Encounter Date: 2025   Encounter department: [facility]    Assessment & Plan  Annual physical exam  Regarding his annual physical patient is given age and diagnosis appropriate evaluation and care.  Patient's ordered the blood work and urine below.  Which also includes a screening test for prostate cancer.  Patient does have nocturia as well.  And he is readvised to do the renal bladder sonogram with PVR.  Patient sent the Shingrix vaccine to his pharmacy.  He declined the flu vaccine.  Patient advised to take multivitamin.  Vitamin D3 2000 units daily.  Continue checking his skin regularly as well as sunscreen.  Diet and regular exercise.  Orders:  •  Lipid Panel with Direct LDL reflex; Future  •  CBC and differential; Future  •  Comprehensive metabolic panel; Future  •  TSH, 3rd generation with Free T4 reflex; Future  •  UA w Reflex to Microscopic w Reflex to Culture; Future    Controlled type 2 diabetes mellitus with complication, without long-term current use of insulin ([facility])    Lab Results   Component Value Date    HGBA1C 9.2 (H) 2024   Not controlled according to his last A1c in July.  Patient is ordered more blood work today.  He asserts that his numbers are much better.  His ACE inhibitor was also refilled today.  Patient is sent to the ophthalmologist.  Patient advised to do daily self foot exam.  Cut down on starches sweets and fats.  Continue his current therapy.  See me again in 4 weeks to discuss in person also.  Orders:  •  lisinopril (ZESTRIL) 10 mg tablet; Take 1 tablet (10 mg total) by mouth daily  •  Hemoglobin A1C; Future  •  Albumin / creatinine urine ratio; Future  •  Comprehensive metabolic panel; Future  •  TSH, 3rd generation with Free T4 reflex; Future  •  Ambulatory referral to Ophthalmology; Future    Primary  hypertension  Not controlled.  Patient has not been taking his medication.  Patient without any acute cardiovascular neurosymptoms right now.  Patient is given a refill today for 90 days with 3 extra refills.  Also advised not to stop his medication without letting us know and or if he has any side effects to please let us know as well.  Advised patient have less salt.  Hydrate well.  Check his labs.  And recheck him again in 4 weeks for his blood pressure as well.  Orders:  •  lisinopril (ZESTRIL) 10 mg tablet; Take 1 tablet (10 mg total) by mouth daily  •  Comprehensive metabolic panel; Future  •  UA w Reflex to Microscopic w Reflex to Culture; Future    CKD (chronic kidney disease) stage 2, GFR 60-89 ml/min  Lab Results   Component Value Date    EGFR 68 07/29/2024    EGFR 56 (L) 10/16/2023    EGFR 73 12/18/2017    CREATININE 1.20 07/29/2024    CREATININE 1.41 (H) 10/16/2023    CREATININE 1.22 09/20/2021   Stable for now.  Recheck labs now.  Advised patient diabetes control as well as hypertension control.  Hydrate well.  Low-sodium diet.  Orders:  •  Comprehensive metabolic panel; Future    Mixed hyperlipidemia  Controlled.  Patient to have less fats starches and sweets.  Diet and exercise.  Continue statin.  Check labs now.  Orders:  •  Lipid Panel with Direct LDL reflex; Future  •  Comprehensive metabolic panel; Future    Eczema, unspecified type  Discussed with patient.  Patient education.  Refilled his cream as per his request.  Follow-up with dermatology if not better and or any changes.  Orders:  •  betamethasone valerate (VALISONE) 0.1 % cream; Apply topically 2 (two) times a day    Overweight (BMI 25.0-29.9)  Advised patient to lose weight with a better diet and start exercising.  Check his labs above.  Continue to monitor and follow-up.       Screening for prostate cancer  Discussed with patient.  Orders:  •  PSA, total and free; Future    Need for vaccination  Discussed with patient.  Orders:  •  Zoster  Vac Recomb Adjuvanted (Shingrix) 50 MCG/0.5ML SUSR; Inject 0.5 mL into a muscle once for 1 dose Repeat dose in 2 to 6 months        RTO 1 month and do the blood work and urine before.  Patient is readvised to do the sonograms of the thyroid, abdominal sonogram, renal bladder sonogram with PVR as well as his AAA screen.        Immunizations and preventive care screenings were discussed with patient today. Appropriate education was printed on patient's after visit summary.        Counseling:  Alcohol/drug use: discussed moderation in alcohol intake, the recommendations for healthy alcohol use, and avoidance of illicit drug use.  Dental Health: discussed importance of regular tooth brushing, flossing, and dental visits.  Injury prevention: discussed safety/seat belts, safety helmets, smoke detectors, carbon monoxide detectors, and smoking near bedding or upholstery.  Sexual health: discussed sexually transmitted diseases, partner selection, use of condoms, avoidance of unintended pregnancy, and contraceptive alternatives.  Exercise: the importance of regular exercise/physical activity was discussed. Recommend exercise 3-5 times per week for at least 30 minutes.       Depression Screening and Follow-up Plan: Patient was screened for depression during today's encounter. They screened negative with a PHQ-2 score of 0.        History of Present Illness     Adult Annual Physical:  Patient presents for annual physical. 64-year-old male here for his annual physical.  Patient is diabetic and needs to be evaluated for his diabetes, hypertension and hyperlipidemia.  Patient has not done any recent blood work or urine.  Patient also has not done his thyroid sonogram, abdominal sonogram, renal bladder sonogram with PVR as well as his abdominal aortic Doppler secondary to his diabetes.  Patient was advised to get those done soon as he can.  Patient's blood pressure is high today because he has stopped taking his lisinopril.  He  thought he was supposed to take it anymore but nobody DC'd it and he had no issues with that as per patient.  So he is requesting a refill on that today.  I rechecked his blood pressure it was the same 160/90.  But patient denies any acute cardiovascular neurosymptoms from it as well.  He is also due for his ophthalmology visit because he is diabetic.  He is up-to-date with his colonoscopy.  He did 1 last year and he needs to do another 1 in 7 years thereafter.  Patient declined flu vaccine today.  He is up-to-date with his Tdap.  He is up-to-date with his pneumonia vaccine.  He does need the Shingrix vaccine.  No history of an adverse reaction to vaccines in the past.  Patient denies tobacco.  He is also requesting a refill on his eczema cream..     Diet and Physical Activity:  - Diet/Nutrition: well balanced diet.  - Exercise: no formal exercise and walking.    Depression Screening:  - PHQ-2 Score: 0    General Health:  - Sleep: sleeps well.  - Hearing: normal hearing bilateral ears.  - Vision: no vision problems and wears glasses.  - Dental: regular dental visits.     Health:  - History of STDs: no.   - Urinary symptoms: nocturia.     Review of Systems   Constitutional:  Negative for activity change, appetite change, chills, fatigue, fever and unexpected weight change.   HENT:  Negative for congestion, dental problem, hearing loss and sore throat.    Eyes:  Negative for visual disturbance.   Respiratory:  Negative for cough and shortness of breath.    Cardiovascular:  Negative for chest pain and palpitations.   Gastrointestinal:  Negative for abdominal pain, blood in stool, constipation, diarrhea, nausea and vomiting.   Genitourinary:  Negative for dysuria and hematuria.   Musculoskeletal:  Negative for arthralgias.   Skin:  Positive for rash.   Neurological:  Negative for dizziness and headaches.   Psychiatric/Behavioral:  Negative for dysphoric mood. The patient is not nervous/anxious.          Objective   BP  160/90   Pulse 70   Temp 98 °F (36.7 °C)   Wt 79.8 kg (176 lb)   SpO2 98%   BMI 28.41 kg/m²     Physical Exam  Vitals reviewed.   Constitutional:       General: He is not in acute distress.     Appearance: Normal appearance. He is not ill-appearing.   HENT:      Head: Normocephalic and atraumatic.      Right Ear: Tympanic membrane, ear canal and external ear normal.      Left Ear: Tympanic membrane, ear canal and external ear normal.      Mouth/Throat:      Mouth: Mucous membranes are moist.      Pharynx: Oropharynx is clear.   Eyes:      Extraocular Movements: Extraocular movements intact.      Conjunctiva/sclera: Conjunctivae normal.   Neck:      Vascular: No carotid bruit.      Comments: Thyroid slightly nodular on exam.  Cardiovascular:      Rate and Rhythm: Normal rate and regular rhythm.   Pulmonary:      Effort: Pulmonary effort is normal.      Breath sounds: Normal breath sounds.   Abdominal:      General: Bowel sounds are normal.      Palpations: Abdomen is soft.      Tenderness: There is no abdominal tenderness. There is no right CVA tenderness or left CVA tenderness.   Musculoskeletal:         General: No tenderness.      Right lower leg: No edema.      Left lower leg: No edema.      Comments: No calf tenderness bilateral.   Lymphadenopathy:      Cervical: No cervical adenopathy.   Skin:     General: Skin is warm.   Neurological:      General: No focal deficit present.      Mental Status: He is alert and oriented to person, place, and time.   Psychiatric:         Mood and Affect: Mood normal.         Behavior: Behavior normal.         Thought Content: Thought content normal.

## 2025-01-27 NOTE — ASSESSMENT & PLAN NOTE
Not controlled.  Patient has not been taking his medication.  Patient without any acute cardiovascular neurosymptoms right now.  Patient is given a refill today for 90 days with 3 extra refills.  Also advised not to stop his medication without letting us know and or if he has any side effects to please let us know as well.  Advised patient have less salt.  Hydrate well.  Check his labs.  And recheck him again in 4 weeks for his blood pressure as well.  Orders:  •  lisinopril (ZESTRIL) 10 mg tablet; Take 1 tablet (10 mg total) by mouth daily  •  Comprehensive metabolic panel; Future  •  UA w Reflex to Microscopic w Reflex to Culture; Future

## 2025-02-20 LAB
LEFT EYE DIABETIC RETINOPATHY: NORMAL
RIGHT EYE DIABETIC RETINOPATHY: NORMAL

## 2025-04-01 DIAGNOSIS — I10 PRIMARY HYPERTENSION: ICD-10-CM

## 2025-04-01 DIAGNOSIS — E78.5 HYPERLIPIDEMIA, UNSPECIFIED HYPERLIPIDEMIA TYPE: ICD-10-CM

## 2025-04-01 DIAGNOSIS — E11.8 CONTROLLED TYPE 2 DIABETES MELLITUS WITH COMPLICATION, WITHOUT LONG-TERM CURRENT USE OF INSULIN (HCC): ICD-10-CM

## 2025-04-01 RX ORDER — GLYBURIDE 5 MG/1
5 TABLET ORAL 2 TIMES DAILY WITH MEALS
Qty: 60 TABLET | Refills: 0 | Status: SHIPPED | OUTPATIENT
Start: 2025-04-01 | End: 2025-06-30

## 2025-04-01 RX ORDER — ATORVASTATIN CALCIUM 20 MG/1
20 TABLET, FILM COATED ORAL DAILY
Qty: 90 TABLET | Refills: 1 | Status: SHIPPED | OUTPATIENT
Start: 2025-04-01

## 2025-04-01 NOTE — TELEPHONE ENCOUNTER
Reason for call:   [x] Refill   [] Prior Auth  [] Other:     Office:   [x] PCP/Provider - Gordon Beavers   [] Specialty/Provider -     Medication: metFORMIN (GLUCOPHAGE) 1000 MG tablet     Dose/Frequency: Take 1 tablet (1,000 mg total) by mouth 2 (two) times a day with meals     Quantity: 180    Medication: atorvastatin (LIPITOR) 20 mg tablet     Dose/Frequency: Take 1 tablet (20 mg total) by mouth daily     Quantity: 90    Medication: Empagliflozin (Jardiance) 10 MG TABS tablet     Dose/Frequency: Take 1 tablet (10 mg total) by mouth daily     Quantity: 90    Medication: glyBURIDE (DIABETA) 5 mg tablet     Dose/Frequency: Take 1 tablet (5 mg total) by mouth 2 (two) times a day with meals     Quantity: 180      Pharmacy: [id number]     Local Pharmacy   Does the patient have enough for 3 days?   [] Yes   [x] No - Send as HP to POD

## 2025-04-01 NOTE — TELEPHONE ENCOUNTER
Patient needs updated blood work and has previously placed orders. Please contact patient to go for labs. Courtesy refill provided.  A1c

## 2025-04-14 ENCOUNTER — APPOINTMENT (OUTPATIENT)
Dept: LAB | Facility: HOSPITAL | Age: 65
End: 2025-04-14
Payer: COMMERCIAL

## 2025-04-14 DIAGNOSIS — E78.2 MIXED HYPERLIPIDEMIA: ICD-10-CM

## 2025-04-14 DIAGNOSIS — I10 PRIMARY HYPERTENSION: ICD-10-CM

## 2025-04-14 DIAGNOSIS — Z12.5 SCREENING FOR PROSTATE CANCER: ICD-10-CM

## 2025-04-14 DIAGNOSIS — E11.8 CONTROLLED TYPE 2 DIABETES MELLITUS WITH COMPLICATION, WITHOUT LONG-TERM CURRENT USE OF INSULIN (HCC): ICD-10-CM

## 2025-04-14 DIAGNOSIS — N18.2 CKD (CHRONIC KIDNEY DISEASE) STAGE 2, GFR 60-89 ML/MIN: ICD-10-CM

## 2025-04-14 DIAGNOSIS — R79.89 ELEVATED TSH: Primary | ICD-10-CM

## 2025-04-14 DIAGNOSIS — Z00.00 ANNUAL PHYSICAL EXAM: ICD-10-CM

## 2025-04-14 LAB
ALBUMIN SERPL BCG-MCNC: 4.8 G/DL (ref 3.5–5)
ALP SERPL-CCNC: 62 U/L (ref 34–104)
ALT SERPL W P-5'-P-CCNC: 46 U/L (ref 7–52)
ANION GAP SERPL CALCULATED.3IONS-SCNC: 9 MMOL/L (ref 4–13)
AST SERPL W P-5'-P-CCNC: 23 U/L (ref 13–39)
BASOPHILS # BLD AUTO: 0.05 THOUSANDS/ÂΜL (ref 0–0.1)
BASOPHILS NFR BLD AUTO: 1 % (ref 0–1)
BILIRUB SERPL-MCNC: 0.51 MG/DL (ref 0.2–1)
BILIRUB UR QL STRIP: NEGATIVE
BUN SERPL-MCNC: 25 MG/DL (ref 5–25)
CALCIUM SERPL-MCNC: 9.9 MG/DL (ref 8.4–10.2)
CHLORIDE SERPL-SCNC: 104 MMOL/L (ref 96–108)
CHOLEST SERPL-MCNC: 204 MG/DL (ref ?–200)
CLARITY UR: CLEAR
CO2 SERPL-SCNC: 28 MMOL/L (ref 21–32)
COLOR UR: NORMAL
CREAT SERPL-MCNC: 1.12 MG/DL (ref 0.6–1.3)
CREAT UR-MCNC: 60 MG/DL
EOSINOPHIL # BLD AUTO: 0.32 THOUSAND/ÂΜL (ref 0–0.61)
EOSINOPHIL NFR BLD AUTO: 4 % (ref 0–6)
ERYTHROCYTE [DISTWIDTH] IN BLOOD BY AUTOMATED COUNT: 13.1 % (ref 11.6–15.1)
EST. AVERAGE GLUCOSE BLD GHB EST-MCNC: 166 MG/DL
GFR SERPL CREATININE-BSD FRML MDRD: 68 ML/MIN/1.73SQ M
GLUCOSE P FAST SERPL-MCNC: 126 MG/DL (ref 65–99)
GLUCOSE UR STRIP-MCNC: NEGATIVE MG/DL
HBA1C MFR BLD: 7.4 %
HCT VFR BLD AUTO: 49.5 % (ref 36.5–49.3)
HDLC SERPL-MCNC: 45 MG/DL
HGB BLD-MCNC: 15.9 G/DL (ref 12–17)
HGB UR QL STRIP.AUTO: NEGATIVE
IMM GRANULOCYTES # BLD AUTO: 0.02 THOUSAND/UL (ref 0–0.2)
IMM GRANULOCYTES NFR BLD AUTO: 0 % (ref 0–2)
KETONES UR STRIP-MCNC: NEGATIVE MG/DL
LDLC SERPL CALC-MCNC: 90 MG/DL (ref 0–100)
LEUKOCYTE ESTERASE UR QL STRIP: NEGATIVE
LYMPHOCYTES # BLD AUTO: 3.02 THOUSANDS/ÂΜL (ref 0.6–4.47)
LYMPHOCYTES NFR BLD AUTO: 40 % (ref 14–44)
MCH RBC QN AUTO: 29.7 PG (ref 26.8–34.3)
MCHC RBC AUTO-ENTMCNC: 32.1 G/DL (ref 31.4–37.4)
MCV RBC AUTO: 93 FL (ref 82–98)
MICROALBUMIN UR-MCNC: 16.8 MG/L
MICROALBUMIN/CREAT 24H UR: 28 MG/G CREATININE (ref 0–30)
MONOCYTES # BLD AUTO: 0.49 THOUSAND/ÂΜL (ref 0.17–1.22)
MONOCYTES NFR BLD AUTO: 7 % (ref 4–12)
NEUTROPHILS # BLD AUTO: 3.61 THOUSANDS/ÂΜL (ref 1.85–7.62)
NEUTS SEG NFR BLD AUTO: 48 % (ref 43–75)
NITRITE UR QL STRIP: NEGATIVE
NRBC BLD AUTO-RTO: 0 /100 WBCS
PH UR STRIP.AUTO: 5 [PH]
PLATELET # BLD AUTO: 164 THOUSANDS/UL (ref 149–390)
PMV BLD AUTO: 9.7 FL (ref 8.9–12.7)
POTASSIUM SERPL-SCNC: 4.4 MMOL/L (ref 3.5–5.3)
PROT SERPL-MCNC: 7.3 G/DL (ref 6.4–8.4)
PROT UR STRIP-MCNC: NEGATIVE MG/DL
PSA FREE MFR SERPL: 26.07 %
PSA FREE SERPL-MCNC: 0.33 NG/ML
PSA SERPL-MCNC: 1.26 NG/ML (ref 0–4)
RBC # BLD AUTO: 5.35 MILLION/UL (ref 3.88–5.62)
SODIUM SERPL-SCNC: 141 MMOL/L (ref 135–147)
SP GR UR STRIP.AUTO: 1.01 (ref 1–1.04)
T4 FREE SERPL-MCNC: 0.55 NG/DL (ref 0.61–1.12)
T4 FREE SERPL-MCNC: 0.64 NG/DL (ref 0.61–1.12)
TRIGL SERPL-MCNC: 344 MG/DL (ref ?–150)
TSH SERPL DL<=0.05 MIU/L-ACNC: 12.61 UIU/ML (ref 0.45–4.5)
UROBILINOGEN UA: NEGATIVE MG/DL
WBC # BLD AUTO: 7.51 THOUSAND/UL (ref 4.31–10.16)

## 2025-04-14 PROCEDURE — 85025 COMPLETE CBC W/AUTO DIFF WBC: CPT

## 2025-04-14 PROCEDURE — 80061 LIPID PANEL: CPT

## 2025-04-14 PROCEDURE — 82570 ASSAY OF URINE CREATININE: CPT

## 2025-04-14 PROCEDURE — 81003 URINALYSIS AUTO W/O SCOPE: CPT

## 2025-04-14 PROCEDURE — 36415 COLL VENOUS BLD VENIPUNCTURE: CPT

## 2025-04-14 PROCEDURE — 84153 ASSAY OF PSA TOTAL: CPT

## 2025-04-14 PROCEDURE — 80053 COMPREHEN METABOLIC PANEL: CPT

## 2025-04-14 PROCEDURE — 84439 ASSAY OF FREE THYROXINE: CPT

## 2025-04-14 PROCEDURE — 84443 ASSAY THYROID STIM HORMONE: CPT

## 2025-04-14 PROCEDURE — 83036 HEMOGLOBIN GLYCOSYLATED A1C: CPT

## 2025-04-14 PROCEDURE — 82043 UR ALBUMIN QUANTITATIVE: CPT

## 2025-04-14 PROCEDURE — 84154 ASSAY OF PSA FREE: CPT

## 2025-04-21 ENCOUNTER — OFFICE VISIT (OUTPATIENT)
Dept: FAMILY MEDICINE CLINIC | Facility: CLINIC | Age: 65
End: 2025-04-21
Payer: COMMERCIAL

## 2025-04-21 VITALS
OXYGEN SATURATION: 98 % | TEMPERATURE: 98 F | SYSTOLIC BLOOD PRESSURE: 144 MMHG | RESPIRATION RATE: 18 BRPM | WEIGHT: 180 LBS | HEART RATE: 60 BPM | DIASTOLIC BLOOD PRESSURE: 80 MMHG | BODY MASS INDEX: 29.05 KG/M2

## 2025-04-21 DIAGNOSIS — B35.1 ONYCHOMYCOSIS: ICD-10-CM

## 2025-04-21 DIAGNOSIS — L60.2 HYPERTROPHIC TOENAIL: ICD-10-CM

## 2025-04-21 DIAGNOSIS — Z23 NEED FOR VACCINATION: ICD-10-CM

## 2025-04-21 DIAGNOSIS — R79.89 ELEVATED TSH: ICD-10-CM

## 2025-04-21 DIAGNOSIS — E11.65 INADEQUATELY CONTROLLED DIABETES MELLITUS (HCC): Primary | ICD-10-CM

## 2025-04-21 DIAGNOSIS — E78.2 MIXED HYPERLIPIDEMIA: ICD-10-CM

## 2025-04-21 DIAGNOSIS — N18.2 CKD (CHRONIC KIDNEY DISEASE) STAGE 2, GFR 60-89 ML/MIN: ICD-10-CM

## 2025-04-21 DIAGNOSIS — I10 PRIMARY HYPERTENSION: ICD-10-CM

## 2025-04-21 PROCEDURE — 99214 OFFICE O/P EST MOD 30 MIN: CPT | Performed by: FAMILY MEDICINE

## 2025-04-21 RX ORDER — ATORVASTATIN CALCIUM 20 MG/1
20 TABLET, FILM COATED ORAL DAILY
Qty: 90 TABLET | Refills: 3 | Status: SHIPPED | OUTPATIENT
Start: 2025-04-21

## 2025-04-21 RX ORDER — LISINOPRIL 10 MG/1
10 TABLET ORAL DAILY
Qty: 90 TABLET | Refills: 3 | Status: SHIPPED | OUTPATIENT
Start: 2025-04-21

## 2025-04-21 RX ORDER — GLYBURIDE 5 MG/1
5 TABLET ORAL 2 TIMES DAILY WITH MEALS
Qty: 180 TABLET | Refills: 3 | Status: SHIPPED | OUTPATIENT
Start: 2025-04-21 | End: 2026-04-16

## 2025-04-21 RX ORDER — ZOSTER VACCINE RECOMBINANT, ADJUVANTED 50 MCG/0.5
0.5 KIT INTRAMUSCULAR ONCE
Qty: 1 EACH | Refills: 1 | Status: SHIPPED | OUTPATIENT
Start: 2025-04-21 | End: 2025-04-21

## 2025-04-21 NOTE — ASSESSMENT & PLAN NOTE
Not controlled secondary to his anxiety especially at the doctor's office.  Patient without any acute cardiovascular or neurosymptoms.  Patient reeducation.  Refilled his medication.  Low-sodium diet.  Lose weight with a better diet and exercise but hydrate well.  Orders:  •  lisinopril (ZESTRIL) 10 mg tablet; Take 1 tablet (10 mg total) by mouth daily

## 2025-04-21 NOTE — ASSESSMENT & PLAN NOTE
Not controlled.  LFTs normal.  His total cholesterol went up to 204 now when it was 146 July last year, triglycerides went up to 344 from 146 from July last year, HDL at 45 and his LDL went up to 90 from 75.  Advised patient to have less fats starches and sweets.  Lose weight with a better diet and excise.  Statin refilled.  Will recheck labs again in 3 months.  Orders:  •  atorvastatin (LIPITOR) 20 mg tablet; Take 1 tablet (20 mg total) by mouth daily

## 2025-04-21 NOTE — PROGRESS NOTES
Name: Elías Bess      : 1960      MRN: [id number]  Encounter Provider: Gordon Beavers MD  Encounter Date: 2025   Encounter department: [facility]  :  Assessment & Plan  Inadequately controlled diabetes mellitus (HCC)    Lab Results   Component Value Date    HGBA1C 7.4 (H) 2025   Not controlled but improved.  His glucose was 126 and his A1c went down to 7.4 now when it was 9.2 in July last year.  Advised patient to have less starches sweets and fats.  Continue current therapy.  His TSH is also elevated so likely treating that should help with this also.  Advised patient to take his medications as is for now.  Meds were refilled.  He is up-to-date with his ophthalmologist.  His diabetic foot exam was done today.  And he is sent to the podiatrist as well; see below.  Exercise as tolerated.  Recheck labs in 3 months.    Orders:  •  metFORMIN (GLUCOPHAGE) 1000 MG tablet; Take 1 tablet (1,000 mg total) by mouth 2 (two) times a day with meals  •  lisinopril (ZESTRIL) 10 mg tablet; Take 1 tablet (10 mg total) by mouth daily  •  glyBURIDE (DIABETA) 5 mg tablet; Take 1 tablet (5 mg total) by mouth 2 (two) times a day with meals  •  Empagliflozin (Jardiance) 10 MG TABS tablet; Take 1 tablet (10 mg total) by mouth daily  •  Ambulatory Referral to Podiatry; Future    Elevated TSH  Elevated TSH at 12.605.  With a free T4 of 0.55.  Discussed with patient.    Patient is advised to recheck his TFTs in 2 weeks.  Also advised to get his thyroid sonogram.  Will see patient again in 4 weeks.  Patient also educated on taking levothyroxine if/when he is prescribed.  Orders:  •  TSH + Free T4; Future    Onychomycosis  Discussed with patient.  LFTs normal.  Patient education.  Patient given referral to podiatry.  Orders:  •  Ambulatory Referral to Podiatry; Future    Hypertrophic toenail  Discussed with patient.  Patient occasion.  Patient given referral to podiatry.  Orders:  •   Ambulatory Referral to Podiatry; Future    Primary hypertension  Not controlled secondary to his anxiety especially at the doctor's office.  Patient without any acute cardiovascular or neurosymptoms.  Patient reeducation.  Refilled his medication.  Low-sodium diet.  Lose weight with a better diet and exercise but hydrate well.  Orders:  •  lisinopril (ZESTRIL) 10 mg tablet; Take 1 tablet (10 mg total) by mouth daily    CKD (chronic kidney disease) stage 2, GFR 60-89 ml/min  Lab Results   Component Value Date    EGFR 68 04/14/2025    EGFR 68 07/29/2024    EGFR 56 (L) 10/16/2023    CREATININE 1.12 04/14/2025    CREATININE 1.20 07/29/2024    CREATININE 1.41 (H) 10/16/2023   Stable CKD 2.  Patient advised diabetes control.  Blood pressure control.  Low-sodium diet.  Hydrate well.  Abdominal sonogram showed help with the kidney structure etc. as well.  Therefore he is readvised to get that done.  Recheck labs in 3 months.         Mixed hyperlipidemia  Not controlled.  LFTs normal.  His total cholesterol went up to 204 now when it was 146 July last year, triglycerides went up to 344 from 146 from July last year, HDL at 45 and his LDL went up to 90 from 75.  Advised patient to have less fats starches and sweets.  Lose weight with a better diet and excise.  Statin refilled.  Will recheck labs again in 3 months.  Orders:  •  atorvastatin (LIPITOR) 20 mg tablet; Take 1 tablet (20 mg total) by mouth daily    Need for vaccination  Discussed with patient.  Orders:  •  Zoster Vac Recomb Adjuvanted (Shingrix) 50 MCG/0.5ML SUSR; Inject 0.5 mL into a muscle once for 1 dose Repeat dose in 2 to 6 months        RTO 4 weeks and do the blood work before.        Depression Screening and Follow-up Plan: Patient was screened for depression during today's encounter. They screened negative with a PHQ-2 score of 0.        History of Present Illness   65-year-old male here for an evaluation of his diabetes, hypertension and hyperlipidemia.   Patient did blood work and urine recently.  Patient gets nervous at the doctor's office hence why his blood pressure upon arrival was high.  I rechecked it at the end of the visit was 144/80.  Patient without any acute cardiovascular or neurosymptoms from it.  Patient is also due for the diabetic foot exam.  Patient asserts that he saw the eye doctor already.  And he is requesting a refill on all of his medications.  Patient has not done the thyroid sonogram, abdominal sonogram or the abdominal aortic Doppler.  And patient asserts that he only has one of the 2 shingles vaccine.  No history of an adverse reaction to vaccines in the past.  No issues with balance or history of falls.  Patient denies tobacco.      Review of Systems   Constitutional:  Negative for chills, fatigue, fever and unexpected weight change.   HENT:  Negative for congestion and sore throat.    Eyes:  Negative for visual disturbance.   Respiratory:  Negative for cough and shortness of breath.    Cardiovascular:  Negative for chest pain and palpitations.   Gastrointestinal:  Negative for abdominal pain, blood in stool, nausea and vomiting.   Genitourinary:  Negative for dysuria and hematuria.   Musculoskeletal:  Negative for back pain and neck pain.   Skin:  Negative for rash.   Neurological:  Negative for dizziness and headaches.   Psychiatric/Behavioral:  Negative for dysphoric mood, self-injury and suicidal ideas. The patient is nervous/anxious.        Objective   /80   Pulse 60   Temp 98 °F (36.7 °C)   Resp 18   Wt 81.6 kg (180 lb)   SpO2 98%   BMI 29.05 kg/m²      Physical Exam  Vitals reviewed.   Constitutional:       General: He is not in acute distress.     Appearance: Normal appearance. He is not ill-appearing.   HENT:      Head: Normocephalic and atraumatic.      Mouth/Throat:      Mouth: Mucous membranes are moist.   Neck:      Vascular: No carotid bruit.   Cardiovascular:      Rate and Rhythm: Normal rate and regular rhythm.       Pulses: no weak pulses.           Dorsalis pedis pulses are 2+ on the right side and 2+ on the left side.        Posterior tibial pulses are 2+ on the right side and 2+ on the left side.   Pulmonary:      Effort: Pulmonary effort is normal.      Breath sounds: Normal breath sounds.   Musculoskeletal:      Right lower leg: No edema.      Left lower leg: No edema.      Comments: No calf tenderness bilateral.   Feet:      Right foot:      Skin integrity: Callus present. No ulcer, skin breakdown, erythema, warmth or dry skin.      Left foot:      Skin integrity: Callus present. No ulcer, skin breakdown, erythema, warmth or dry skin.   Lymphadenopathy:      Cervical: No cervical adenopathy.   Skin:     General: Skin is warm.   Neurological:      General: No focal deficit present.      Mental Status: He is alert and oriented to person, place, and time.   Psychiatric:         Mood and Affect: Mood normal.         Behavior: Behavior normal.         Thought Content: Thought content normal.      Comments: Slight anxious as usual.           Diabetic Foot Exam    Patient's shoes and socks removed.    Right Foot/Ankle   Right Foot Inspection  Skin Exam: skin normal, skin intact, callus and callus. No dry skin, no warmth, no erythema, no maceration, no abnormal color, no pre-ulcer and no ulcer.     Toe Exam: ROM and strength within normal limits. No swelling, no tenderness, erythema and  no right toe deformity    Sensory   Monofilament testing: intact    Vascular  Capillary refills: < 3 seconds  The right DP pulse is 2+. The right PT pulse is 2+.     Left Foot/Ankle  Left Foot Inspection  Skin Exam: skin normal, skin intact and callus. No dry skin, no warmth, no erythema, no maceration, normal color, no pre-ulcer and no ulcer.     Toe Exam: ROM and strength within normal limits. No swelling, no tenderness, no erythema and no left toe deformity.     Sensory   Monofilament testing: intact    Vascular  Capillary refills: < 3  seconds  The left DP pulse is 2+. The left PT pulse is 2+.     Assign Risk Category  No deformity present  No loss of protective sensation  No weak pulses  Risk: 0

## 2025-04-21 NOTE — ASSESSMENT & PLAN NOTE
Lab Results   Component Value Date    HGBA1C 7.4 (H) 04/14/2025   Not controlled but improved.  His glucose was 126 and his A1c went down to 7.4 now when it was 9.2 in July last year.  Advised patient to have less starches sweets and fats.  Continue current therapy.  His TSH is also elevated so likely treating that should help with this also.  Advised patient to take his medications as is for now.  Meds were refilled.  He is up-to-date with his ophthalmologist.  His diabetic foot exam was done today.  And he is sent to the podiatrist as well; see below.  Exercise as tolerated.  Recheck labs in 3 months.    Orders:  •  metFORMIN (GLUCOPHAGE) 1000 MG tablet; Take 1 tablet (1,000 mg total) by mouth 2 (two) times a day with meals  •  lisinopril (ZESTRIL) 10 mg tablet; Take 1 tablet (10 mg total) by mouth daily  •  glyBURIDE (DIABETA) 5 mg tablet; Take 1 tablet (5 mg total) by mouth 2 (two) times a day with meals  •  Empagliflozin (Jardiance) 10 MG TABS tablet; Take 1 tablet (10 mg total) by mouth daily  •  Ambulatory Referral to Podiatry; Future   n/a

## 2025-04-21 NOTE — ASSESSMENT & PLAN NOTE
Lab Results   Component Value Date    EGFR 68 04/14/2025    EGFR 68 07/29/2024    EGFR 56 (L) 10/16/2023    CREATININE 1.12 04/14/2025    CREATININE 1.20 07/29/2024    CREATININE 1.41 (H) 10/16/2023   Stable CKD 2.  Patient advised diabetes control.  Blood pressure control.  Low-sodium diet.  Hydrate well.  Abdominal sonogram showed help with the kidney structure etc. as well.  Therefore he is readvised to get that done.  Recheck labs in 3 months.

## 2025-04-28 ENCOUNTER — TELEPHONE (OUTPATIENT)
Age: 65
End: 2025-04-28

## 2025-04-28 NOTE — TELEPHONE ENCOUNTER
Can you please read the message below and then called patient to find out what he is talking about.  Because I did not order any CT for him, and I do not see an order that was put in by anyone else either.  Thank you

## 2025-04-28 NOTE — TELEPHONE ENCOUNTER
Language line was used to interpret in Green[place]ic-#[id number]    Patient called letting us know that he was ordered a CT scan for his heart. He said he is not able to do this because his insurance will not cover it. I do not see this order in his chart.     Patient is asking what his next steps are since he cannot have the CT scan done.     Please follow up with patient regarding what Dr. Beavers recommends.

## 2025-05-03 ENCOUNTER — HOSPITAL ENCOUNTER (OUTPATIENT)
Dept: ULTRASOUND IMAGING | Facility: HOSPITAL | Age: 65
Discharge: HOME | End: 2025-05-03
Attending: FAMILY MEDICINE
Payer: COMMERCIAL

## 2025-05-03 ENCOUNTER — APPOINTMENT (OUTPATIENT)
Dept: LAB | Facility: HOSPITAL | Age: 65
End: 2025-05-03
Payer: COMMERCIAL

## 2025-05-03 DIAGNOSIS — R79.89 ELEVATED TSH: ICD-10-CM

## 2025-05-03 DIAGNOSIS — R79.89 LFT ELEVATION: ICD-10-CM

## 2025-05-03 LAB
T4 FREE SERPL-MCNC: 0.65 NG/DL (ref 0.61–1.12)
TSH SERPL DL<=0.05 MIU/L-ACNC: 7.99 UIU/ML (ref 0.45–4.5)

## 2025-05-03 PROCEDURE — 76536 US EXAM OF HEAD AND NECK: CPT

## 2025-05-03 PROCEDURE — 76700 US EXAM ABDOM COMPLETE: CPT

## 2025-05-03 PROCEDURE — 84443 ASSAY THYROID STIM HORMONE: CPT

## 2025-05-03 PROCEDURE — 84439 ASSAY OF FREE THYROXINE: CPT

## 2025-05-03 PROCEDURE — 36415 COLL VENOUS BLD VENIPUNCTURE: CPT

## 2025-05-12 ENCOUNTER — RESULTS FOLLOW-UP (OUTPATIENT)
Dept: FAMILY MEDICINE CLINIC | Facility: CLINIC | Age: 65
End: 2025-05-12

## 2025-05-19 ENCOUNTER — OFFICE VISIT (OUTPATIENT)
Dept: FAMILY MEDICINE CLINIC | Facility: CLINIC | Age: 65
End: 2025-05-19
Payer: COMMERCIAL

## 2025-05-19 VITALS
OXYGEN SATURATION: 97 % | RESPIRATION RATE: 16 BRPM | DIASTOLIC BLOOD PRESSURE: 83 MMHG | TEMPERATURE: 98.1 F | SYSTOLIC BLOOD PRESSURE: 143 MMHG | BODY MASS INDEX: 29.41 KG/M2 | HEART RATE: 75 BPM | HEIGHT: 66 IN | WEIGHT: 183 LBS

## 2025-05-19 DIAGNOSIS — E03.9 ACQUIRED HYPOTHYROIDISM: Primary | ICD-10-CM

## 2025-05-19 DIAGNOSIS — N18.2 CKD (CHRONIC KIDNEY DISEASE) STAGE 2, GFR 60-89 ML/MIN: ICD-10-CM

## 2025-05-19 DIAGNOSIS — K76.0 HEPATIC STEATOSIS: ICD-10-CM

## 2025-05-19 DIAGNOSIS — I10 PRIMARY HYPERTENSION: ICD-10-CM

## 2025-05-19 DIAGNOSIS — E11.8 CONTROLLED TYPE 2 DIABETES MELLITUS WITH COMPLICATION, WITHOUT LONG-TERM CURRENT USE OF INSULIN (HCC): ICD-10-CM

## 2025-05-19 DIAGNOSIS — E66.3 OVERWEIGHT WITH BODY MASS INDEX (BMI) OF 29 TO 29.9 IN ADULT: ICD-10-CM

## 2025-05-19 DIAGNOSIS — E78.2 MIXED HYPERLIPIDEMIA: ICD-10-CM

## 2025-05-19 DIAGNOSIS — E04.1 RIGHT THYROID NODULE: ICD-10-CM

## 2025-05-19 PROCEDURE — 99214 OFFICE O/P EST MOD 30 MIN: CPT | Performed by: FAMILY MEDICINE

## 2025-05-19 RX ORDER — LEVOTHYROXINE SODIUM 50 UG/1
50 TABLET ORAL DAILY
Qty: 30 TABLET | Refills: 3 | Status: SHIPPED | OUTPATIENT
Start: 2025-05-19

## 2025-05-19 NOTE — ASSESSMENT & PLAN NOTE
Diagnosed on his recent abdominal sonogram.  Discussed with patient.  Patient advised to have less fats.  Less alcohol.  Lose weight with a better diet and exercise.  Less starches and sweets also.  Orders:  •  Comprehensive metabolic panel; Future

## 2025-05-19 NOTE — ASSESSMENT & PLAN NOTE
Newly diagnosed on his recent thyroid sonogram.  0.7 cm.  Discussed with patient.  Recheck sonogram in 1 year also.

## 2025-05-19 NOTE — ASSESSMENT & PLAN NOTE
Relatively controlled.  Patient does get nervous.  Advised patient low-sodium diet.  Hydrate well.  Continue current therapy.  Labs reviewed from April and we will recheck labs again in 2 months.  Orders:  •  Comprehensive metabolic panel; Future  •  UA w Reflex to Microscopic w Reflex to Culture; Future

## 2025-05-19 NOTE — ASSESSMENT & PLAN NOTE
For weight and BMI of 29.54.  Advised patient to have less starches sweets and fats.  Lose weight with a better diet and exercise.  Continue to monitor and follow.

## 2025-05-19 NOTE — ASSESSMENT & PLAN NOTE
Advised less fat starches and sweets.  Continue current therapy.  Check labs.  Orders:  •  Comprehensive metabolic panel; Future  •  Lipid panel; Future

## 2025-05-19 NOTE — ASSESSMENT & PLAN NOTE
Lab Results   Component Value Date    EGFR 68 04/14/2025    EGFR 68 07/29/2024    EGFR 56 (L) 10/16/2023    CREATININE 1.12 04/14/2025    CREATININE 1.20 07/29/2024    CREATININE 1.41 (H) 10/16/2023   Stable.  Advised patient diabetes control and blood pressure control.  Recheck labs with his next labs in 2 months.    Orders:  •  Comprehensive metabolic panel; Future

## 2025-05-19 NOTE — ASSESSMENT & PLAN NOTE
New diagnosis.  His TSH now 7.990 on 5/3/2025 when it was 12.6 on 4/14/2025.  His thyroid sonogram showed a right thyroid nodule 0.7 cm.  Discussed with patient.  Patient education.  Patient is started on levothyroxine 50 mcg to be taken in the morning and out of the stomach and nothing with it either including any medication or supplement such.  And also n.p.o. for 45 to 60 minutes.  Patient will recheck his labs again in 6 to 7 weeks.  Patient will see me again in 2 months.  If he experiences any adverse reaction to the medication etc. then patient is advised to stop it and call me.  Orders:  •  levothyroxine (Synthroid) 50 mcg tablet; Take 1 tablet (50 mcg total) by mouth daily  •  TSH, 3rd generation with Free T4 reflex; Future

## 2025-05-19 NOTE — PROGRESS NOTES
Name: Elías Bess      : 1960      MRN: [id number]  Encounter Provider: Gordon Beavers MD  Encounter Date: 2025   Encounter department: [facility]  :  Assessment & Plan  Acquired hypothyroidism  New diagnosis.  His TSH now 7.990 on 5/3/2025 when it was 12.6 on 2025.  His thyroid sonogram showed a right thyroid nodule 0.7 cm.  Discussed with patient.  Patient education.  Patient is started on levothyroxine 50 mcg to be taken in the morning and out of the stomach and nothing with it either including any medication or supplement such.  And also n.p.o. for 45 to 60 minutes.  Patient will recheck his labs again in 6 to 7 weeks.  Patient will see me again in 2 months.  If he experiences any adverse reaction to the medication etc. then patient is advised to stop it and call me.  Orders:  •  levothyroxine (Synthroid) 50 mcg tablet; Take 1 tablet (50 mcg total) by mouth daily  •  TSH, 3rd generation with Free T4 reflex; Future    Right thyroid nodule  Newly diagnosed on his recent thyroid sonogram.  0.7 cm.  Discussed with patient.  Recheck sonogram in 1 year also.       Hepatic steatosis  Diagnosed on his recent abdominal sonogram.  Discussed with patient.  Patient advised to have less fats.  Less alcohol.  Lose weight with a better diet and exercise.  Less starches and sweets also.  Orders:  •  Comprehensive metabolic panel; Future    Primary hypertension  Relatively controlled.  Patient does get nervous.  Advised patient low-sodium diet.  Hydrate well.  Continue current therapy.  Labs reviewed from April and we will recheck labs again in 2 months.  Orders:  •  Comprehensive metabolic panel; Future  •  UA w Reflex to Microscopic w Reflex to Culture; Future    CKD (chronic kidney disease) stage 2, GFR 60-89 ml/min  Lab Results   Component Value Date    EGFR 68 2025    EGFR 68 2024    EGFR 56 (L) 10/16/2023    CREATININE 1.12 2025    CREATININE  1.20 07/29/2024    CREATININE 1.41 (H) 10/16/2023   Stable.  Advised patient diabetes control and blood pressure control.  Recheck labs with his next labs in 2 months.    Orders:  •  Comprehensive metabolic panel; Future    Controlled type 2 diabetes mellitus with complication, without long-term current use of insulin ([facility])    Lab Results   Component Value Date    HGBA1C 7.4 (H) 04/14/2025   Not controlled.  Advised patient more compliance.  Recheck labs in 2 months which will be 3 months after his last labs.  Continue current therapy.  Advised less starches sweets and fats.  Lose weight with a better diet and exercise.    Orders:  •  Comprehensive metabolic panel; Future  •  Hemoglobin A1C; Future    Mixed hyperlipidemia  Advised less fat starches and sweets.  Continue current therapy.  Check labs.  Orders:  •  Comprehensive metabolic panel; Future  •  Lipid panel; Future    Overweight with body mass index (BMI) of 29 to 29.9 in adult    For weight and BMI of 29.54.  Advised patient to have less starches sweets and fats.  Lose weight with a better diet and exercise.  Continue to monitor and follow.           RTO 2 months and do the blood work and urine before.           History of Present Illness   65-year-old male here for an evaluation of his thyroid, hyperlipidemia and diabetes.  Patient did have a thyroid repeat blood work as well as a thyroid sonogram and an abdominal sonogram on 5/3/2025.  Patient denies tobacco.      Review of Systems   Constitutional:  Negative for chills, fatigue, fever and unexpected weight change.   Eyes:  Negative for visual disturbance.   Respiratory:  Negative for cough and shortness of breath.    Cardiovascular:  Negative for chest pain, palpitations and leg swelling.   Gastrointestinal:  Negative for abdominal pain.   Genitourinary:  Negative for dysuria.   Neurological:  Negative for dizziness and headaches.   Psychiatric/Behavioral:  Negative for dysphoric mood. The patient is not  "nervous/anxious.        Objective   /83 (BP Location: Left arm, Patient Position: Sitting, Cuff Size: Standard)   Pulse 75   Temp 98.1 °F (36.7 °C) (Temporal)   Resp 16   Ht 5' 6\" (1.676 m)   Wt 83 kg (183 lb)   SpO2 97%   BMI 29.54 kg/m²      Physical Exam  Vitals reviewed.   Constitutional:       General: He is not in acute distress.     Appearance: Normal appearance. He is not ill-appearing.   Neck:      Vascular: No carotid bruit.     Cardiovascular:      Rate and Rhythm: Normal rate and regular rhythm.   Pulmonary:      Effort: Pulmonary effort is normal.      Breath sounds: Normal breath sounds.     Musculoskeletal:      Right lower leg: No edema.      Left lower leg: No edema.     Neurological:      General: No focal deficit present.      Mental Status: He is alert and oriented to person, place, and time.     Psychiatric:         Mood and Affect: Mood normal.         Behavior: Behavior normal.         Thought Content: Thought content normal.         "

## 2025-05-19 NOTE — ASSESSMENT & PLAN NOTE
Lab Results   Component Value Date    HGBA1C 7.4 (H) 04/14/2025   Not controlled.  Advised patient more compliance.  Recheck labs in 2 months which will be 3 months after his last labs.  Continue current therapy.  Advised less starches sweets and fats.  Lose weight with a better diet and exercise.    Orders:  •  Comprehensive metabolic panel; Future  •  Hemoglobin A1C; Future

## 2025-05-21 DIAGNOSIS — E03.9 ACQUIRED HYPOTHYROIDISM: ICD-10-CM

## 2025-05-21 RX ORDER — LEVOTHYROXINE SODIUM 50 UG/1
50 TABLET ORAL DAILY
Qty: 90 TABLET | Refills: 1 | OUTPATIENT
Start: 2025-05-21

## 2025-05-21 NOTE — TELEPHONE ENCOUNTER
Please call the pharmacy and let them know that I am starting the patient on this medication now for the first time and I would like to recheck it again in about 2 months to make sure that the dose that I started him on is the correct dose.  Once that is established that I can send over 90-day supply.  Thank you    So for now I will just decline the refill of this medication so was not in my queue. ty

## 2025-05-23 DIAGNOSIS — E03.9 ACQUIRED HYPOTHYROIDISM: ICD-10-CM

## 2025-06-11 ENCOUNTER — TELEPHONE (OUTPATIENT)
Dept: FAMILY MEDICINE CLINIC | Facility: CLINIC | Age: 65
End: 2025-06-11

## 2025-06-11 NOTE — TELEPHONE ENCOUNTER
Patient called the [facility] asking for Karon stating she knows what is going on with medication. Patient states he went to the pharmacy and they advised him they don't have any medication for him. Patient states he hasn't not taken his medication for 15 days now. Patient would like someone to call the pharmacy to get his refills. Patient keeps asking for Karon.

## 2025-07-14 ENCOUNTER — APPOINTMENT (OUTPATIENT)
Dept: LAB | Facility: HOSPITAL | Age: 65
End: 2025-07-14
Payer: COMMERCIAL

## 2025-07-14 DIAGNOSIS — N18.2 CKD (CHRONIC KIDNEY DISEASE) STAGE 2, GFR 60-89 ML/MIN: ICD-10-CM

## 2025-07-14 DIAGNOSIS — I10 PRIMARY HYPERTENSION: ICD-10-CM

## 2025-07-14 DIAGNOSIS — E03.9 ACQUIRED HYPOTHYROIDISM: ICD-10-CM

## 2025-07-14 DIAGNOSIS — K76.0 HEPATIC STEATOSIS: ICD-10-CM

## 2025-07-14 DIAGNOSIS — E11.8 CONTROLLED TYPE 2 DIABETES MELLITUS WITH COMPLICATION, WITHOUT LONG-TERM CURRENT USE OF INSULIN (HCC): ICD-10-CM

## 2025-07-14 DIAGNOSIS — E78.2 MIXED HYPERLIPIDEMIA: ICD-10-CM

## 2025-07-14 LAB
ALBUMIN SERPL BCG-MCNC: 5 G/DL (ref 3.5–5)
ALP SERPL-CCNC: 68 U/L (ref 34–104)
ALT SERPL W P-5'-P-CCNC: 50 U/L (ref 7–52)
ANION GAP SERPL CALCULATED.3IONS-SCNC: 8 MMOL/L (ref 4–13)
AST SERPL W P-5'-P-CCNC: 27 U/L (ref 13–39)
BACTERIA UR QL AUTO: ABNORMAL /HPF
BILIRUB SERPL-MCNC: 1.04 MG/DL (ref 0.2–1)
BILIRUB UR QL STRIP: NEGATIVE
BUN SERPL-MCNC: 21 MG/DL (ref 5–25)
CALCIUM SERPL-MCNC: 10.3 MG/DL (ref 8.4–10.2)
CHLORIDE SERPL-SCNC: 102 MMOL/L (ref 96–108)
CHOLEST SERPL-MCNC: 135 MG/DL (ref ?–200)
CLARITY UR: CLEAR
CO2 SERPL-SCNC: 29 MMOL/L (ref 21–32)
COLOR UR: YELLOW
CREAT SERPL-MCNC: 1.27 MG/DL (ref 0.6–1.3)
GFR SERPL CREATININE-BSD FRML MDRD: 58 ML/MIN/1.73SQ M
GLUCOSE P FAST SERPL-MCNC: 120 MG/DL (ref 65–99)
GLUCOSE UR STRIP-MCNC: ABNORMAL MG/DL
HDLC SERPL-MCNC: 44 MG/DL
HGB UR QL STRIP.AUTO: NEGATIVE
HYALINE CASTS #/AREA URNS LPF: ABNORMAL /LPF
KETONES UR STRIP-MCNC: NEGATIVE MG/DL
LDLC SERPL CALC-MCNC: 63 MG/DL (ref 0–100)
LEUKOCYTE ESTERASE UR QL STRIP: NEGATIVE
NITRITE UR QL STRIP: NEGATIVE
NON-SQ EPI CELLS URNS QL MICRO: ABNORMAL /HPF
NONHDLC SERPL-MCNC: 91 MG/DL
PH UR STRIP.AUTO: 6 [PH]
POTASSIUM SERPL-SCNC: 4.5 MMOL/L (ref 3.5–5.3)
PROT SERPL-MCNC: 7.9 G/DL (ref 6.4–8.4)
PROT UR STRIP-MCNC: ABNORMAL MG/DL
RBC #/AREA URNS AUTO: ABNORMAL /HPF
SODIUM SERPL-SCNC: 139 MMOL/L (ref 135–147)
SP GR UR STRIP.AUTO: 1.01 (ref 1–1.04)
T4 FREE SERPL-MCNC: 0.82 NG/DL (ref 0.61–1.12)
TRIGL SERPL-MCNC: 138 MG/DL (ref ?–150)
TSH SERPL DL<=0.05 MIU/L-ACNC: 7.05 UIU/ML (ref 0.45–4.5)
UROBILINOGEN UA: NEGATIVE MG/DL
WBC #/AREA URNS AUTO: ABNORMAL /HPF

## 2025-07-14 PROCEDURE — 80053 COMPREHEN METABOLIC PANEL: CPT

## 2025-07-14 PROCEDURE — 81003 URINALYSIS AUTO W/O SCOPE: CPT

## 2025-07-14 PROCEDURE — 84439 ASSAY OF FREE THYROXINE: CPT

## 2025-07-14 PROCEDURE — 80061 LIPID PANEL: CPT

## 2025-07-14 PROCEDURE — 83036 HEMOGLOBIN GLYCOSYLATED A1C: CPT

## 2025-07-14 PROCEDURE — 84443 ASSAY THYROID STIM HORMONE: CPT

## 2025-07-14 PROCEDURE — 81001 URINALYSIS AUTO W/SCOPE: CPT

## 2025-07-14 PROCEDURE — 36415 COLL VENOUS BLD VENIPUNCTURE: CPT

## 2025-07-15 LAB
EST. AVERAGE GLUCOSE BLD GHB EST-MCNC: 214 MG/DL
HBA1C MFR BLD: 9.1 %

## 2025-07-21 ENCOUNTER — OFFICE VISIT (OUTPATIENT)
Dept: FAMILY MEDICINE CLINIC | Facility: CLINIC | Age: 65
End: 2025-07-21
Payer: COMMERCIAL

## 2025-07-21 VITALS
SYSTOLIC BLOOD PRESSURE: 137 MMHG | HEIGHT: 66 IN | WEIGHT: 176 LBS | HEART RATE: 79 BPM | OXYGEN SATURATION: 98 % | BODY MASS INDEX: 28.28 KG/M2 | TEMPERATURE: 97.9 F | RESPIRATION RATE: 16 BRPM | DIASTOLIC BLOOD PRESSURE: 72 MMHG

## 2025-07-21 DIAGNOSIS — E11.65 INADEQUATELY CONTROLLED DIABETES MELLITUS (HCC): Primary | ICD-10-CM

## 2025-07-21 DIAGNOSIS — E11.22 CKD STAGE 2 DUE TO TYPE 2 DIABETES MELLITUS  (HCC): ICD-10-CM

## 2025-07-21 DIAGNOSIS — E78.2 MIXED HYPERLIPIDEMIA: ICD-10-CM

## 2025-07-21 DIAGNOSIS — E04.1 RIGHT THYROID NODULE: ICD-10-CM

## 2025-07-21 DIAGNOSIS — E03.9 ACQUIRED HYPOTHYROIDISM: ICD-10-CM

## 2025-07-21 DIAGNOSIS — I10 PRIMARY HYPERTENSION: ICD-10-CM

## 2025-07-21 DIAGNOSIS — N18.2 CKD STAGE 2 DUE TO TYPE 2 DIABETES MELLITUS  (HCC): ICD-10-CM

## 2025-07-21 PROCEDURE — 99214 OFFICE O/P EST MOD 30 MIN: CPT | Performed by: FAMILY MEDICINE

## 2025-07-21 RX ORDER — LEVOTHYROXINE SODIUM 75 UG/1
75 TABLET ORAL DAILY
Qty: 90 TABLET | Refills: 3 | Status: SHIPPED | OUTPATIENT
Start: 2025-07-21

## 2025-07-21 NOTE — ASSESSMENT & PLAN NOTE
Controlled.  Advised low-sodium diet.  Hydrate well.  Continue current therapy.  Orders:  •  Comprehensive metabolic panel; Future

## 2025-07-21 NOTE — PROGRESS NOTES
Name: Elías Bess      : 1960      MRN: [id number]  Encounter Provider: Gordon Beavers MD  Encounter Date: 2025   Encounter department: [facility]  :  Assessment & Plan  Inadequately controlled diabetes mellitus (HCC)    Lab Results   Component Value Date    HGBA1C 9.1 (H) 2025   Not controlled.  His glucose was 120 but his A1c went up to 9.9 when it was 7.4 in April of this year and 9.2 in July last year.  Patient was informed of the labs and the results.  Patient was reeducated on having the starches sweets and fats.  Patient asserts that he does take both of his medications and takes them appropriately.  Patient was also made aware of his elevated TSH.  Will increase his levothyroxine which should help with this also.  And I will recheck his labs again in 3 months.  Patient advised to follow-up with his ophthalmologist as scheduled.  And continue checking his feet himself daily.    Orders:  •  Hemoglobin A1C; Future  •  Comprehensive metabolic panel; Future  •  Albumin / creatinine urine ratio; Future    CKD stage 2 due to type 2 diabetes mellitus  (HCC)    Lab Results   Component Value Date    HGBA1C 9.1 (H) 2025   CKD stable.  Advised patient have diabetes and blood pressure control.  Labs reviewed and recheck labs in 3 months also.  Calcium is slightly elevated.  Patient asymptomatic.  Will continue to monitor.  Hydrate well/well.  And fast for 8 hours for his labs from food etc. but not water.    Orders:  •  Hemoglobin A1C; Future  •  Comprehensive metabolic panel; Future  •  Albumin / creatinine urine ratio; Future    Primary hypertension  Controlled.  Advised low-sodium diet.  Hydrate well.  Continue current therapy.  Orders:  •  Comprehensive metabolic panel; Future    Mixed hyperlipidemia  Controlled.  LFTs normal.  Bilirubin slightly elevated at 1.04 and asymptomatic.  His total cholesterol went down to 135 now when it was 204 in April of  this year, triglycerides went down to 138 from 344, HDL 44 and his LDL went down to 63 from 90.  Patient was commended on improving his FLP.  Continue doing the great work.  Continue current therapy.  Recheck labs with his next labs in 3 months also.  Orders:  •  Comprehensive metabolic panel; Future  •  Lipid Panel with Direct LDL reflex; Future    Acquired hypothyroidism  Not controlled.  Elevated TSH at 7.07 when it was 7.990 in May of this year and 12.6 in April of this year.  Reviewed with patient the appropriate way of taking levothyroxine and he asserts that he is doing it.  Patient is at 50 and is increased to 75 mcg of levothyroxine.  Will recheck his labs again for his thyroid with his next labs in 3 months.  If he has any issues with the new dose and or anything else, patient is advised to reach out to me.  Orders:  •  TSH, 3rd generation with Free T4 reflex; Future  •  levothyroxine (Synthroid) 75 mcg tablet; Take 1 tablet (75 mcg total) by mouth daily    Right thyroid nodule  0.7 cm on his last sonogram in May.  Discussed with patient.  Will recheck another sonogram in 1 year as well.  And follow-up on his TFTs as above.           RTO 3 months and do the labs 1 week before.  Patient can see me prior to that for anything else in between.           History of Present Illness   65-year-old male here for an evaluation of his diabetes, hypertension and hyperlipidemia.  Patient did labs recently.  Patient had a thyroid sonogram in May which showed a right thyroid nodule 0.7 cm.  He also had an abdominal sonogram at that time which showed hepatic steatosis.  Patient denies tobacco.      Review of Systems   Constitutional:  Negative for chills, fatigue, fever and unexpected weight change.   HENT:  Negative for congestion and sore throat.    Eyes:  Negative for visual disturbance.   Respiratory:  Negative for cough and shortness of breath.    Cardiovascular:  Negative for chest pain and palpitations.  "  Gastrointestinal:  Negative for abdominal pain.   Genitourinary:  Negative for dysuria.   Neurological:  Negative for dizziness and headaches.   Psychiatric/Behavioral:  Negative for dysphoric mood. The patient is not nervous/anxious.        Objective   /72 (BP Location: Left arm, Patient Position: Sitting, Cuff Size: Adult)   Pulse 79   Temp 97.9 °F (36.6 °C) (Temporal)   Resp 16   Ht 5' 6\" (1.676 m)   Wt 79.8 kg (176 lb)   SpO2 98%   BMI 28.41 kg/m²      Physical Exam  Vitals reviewed.   Constitutional:       General: He is not in acute distress.     Appearance: Normal appearance. He is not ill-appearing.   HENT:      Mouth/Throat:      Mouth: Mucous membranes are moist.   Neck:      Vascular: No carotid bruit.     Cardiovascular:      Rate and Rhythm: Normal rate and regular rhythm.   Pulmonary:      Effort: Pulmonary effort is normal.      Breath sounds: Normal breath sounds.     Musculoskeletal:      Right lower leg: No edema.      Left lower leg: No edema.     Neurological:      General: No focal deficit present.      Mental Status: He is alert and oriented to person, place, and time.     Psychiatric:         Mood and Affect: Mood normal.         Behavior: Behavior normal.         Thought Content: Thought content normal.         "

## 2025-07-21 NOTE — ASSESSMENT & PLAN NOTE
Controlled.  LFTs normal.  Bilirubin slightly elevated at 1.04 and asymptomatic.  His total cholesterol went down to 135 now when it was 204 in April of this year, triglycerides went down to 138 from 344, HDL 44 and his LDL went down to 63 from 90.  Patient was commended on improving his FLP.  Continue doing the great work.  Continue current therapy.  Recheck labs with his next labs in 3 months also.  Orders:  •  Comprehensive metabolic panel; Future  •  Lipid Panel with Direct LDL reflex; Future

## 2025-07-21 NOTE — ASSESSMENT & PLAN NOTE
Lab Results   Component Value Date    HGBA1C 9.1 (H) 07/14/2025   CKD stable.  Advised patient have diabetes and blood pressure control.  Labs reviewed and recheck labs in 3 months also.  Calcium is slightly elevated.  Patient asymptomatic.  Will continue to monitor.  Hydrate well/well.  And fast for 8 hours for his labs from food etc. but not water.    Orders:  •  Hemoglobin A1C; Future  •  Comprehensive metabolic panel; Future  •  Albumin / creatinine urine ratio; Future

## 2025-07-21 NOTE — ASSESSMENT & PLAN NOTE
Lab Results   Component Value Date    HGBA1C 9.1 (H) 07/14/2025   Not controlled.  His glucose was 120 but his A1c went up to 9.9 when it was 7.4 in April of this year and 9.2 in July last year.  Patient was informed of the labs and the results.  Patient was reeducated on having the starches sweets and fats.  Patient asserts that he does take both of his medications and takes them appropriately.  Patient was also made aware of his elevated TSH.  Will increase his levothyroxine which should help with this also.  And I will recheck his labs again in 3 months.  Patient advised to follow-up with his ophthalmologist as scheduled.  And continue checking his feet himself daily.    Orders:  •  Hemoglobin A1C; Future  •  Comprehensive metabolic panel; Future  •  Albumin / creatinine urine ratio; Future

## 2025-07-21 NOTE — ASSESSMENT & PLAN NOTE
0.7 cm on his last sonogram in May.  Discussed with patient.  Will recheck another sonogram in 1 year as well.  And follow-up on his TFTs as above.

## 2025-07-21 NOTE — ASSESSMENT & PLAN NOTE
Not controlled.  Elevated TSH at 7.07 when it was 7.990 in May of this year and 12.6 in April of this year.  Reviewed with patient the appropriate way of taking levothyroxine and he asserts that he is doing it.  Patient is at 50 and is increased to 75 mcg of levothyroxine.  Will recheck his labs again for his thyroid with his next labs in 3 months.  If he has any issues with the new dose and or anything else, patient is advised to reach out to me.  Orders:  •  TSH, 3rd generation with Free T4 reflex; Future  •  levothyroxine (Synthroid) 75 mcg tablet; Take 1 tablet (75 mcg total) by mouth daily

## 2025-08-15 ENCOUNTER — HOSPITAL ENCOUNTER (EMERGENCY)
Facility: HOSPITAL | Age: 65
Discharge: HOME | End: 2025-08-15
Attending: EMERGENCY MEDICINE
Payer: OTHER MISCELLANEOUS